# Patient Record
Sex: FEMALE | Race: BLACK OR AFRICAN AMERICAN | Employment: FULL TIME | URBAN - METROPOLITAN AREA
[De-identification: names, ages, dates, MRNs, and addresses within clinical notes are randomized per-mention and may not be internally consistent; named-entity substitution may affect disease eponyms.]

---

## 2017-05-11 ENCOUNTER — TELEPHONE (OUTPATIENT)
Dept: OBGYN CLINIC | Age: 27
End: 2017-05-11

## 2017-05-11 NOTE — TELEPHONE ENCOUNTER
Call received at 11:43 am      32year old patient last seen in the office on 4/15/16. Patient states she was seen in [place] for AE. Patient states she has her records from that visit. Patient reports that she has stopped taking birthcontrol back in November 2016. Patient reports the month of April that she has had two cycles and one starting on April 13-17 and the second April 25-29. Patient reports the bleeding is heavier than when she was on the pill. Patient reports mild cramping and states she changes her pad or tampon every couple of hours. Patient reports starting cycle today May 11, 2017. This nurse advised patient to check UPT. Patient states she is not pregnant because she uses protection. Patient wondering if she needs to be seen.  Please advise

## 2017-05-12 NOTE — TELEPHONE ENCOUNTER
Patient called back and was advised of MD recommendations. Patient chose to be placed on the schedule for 1:30pm on 5/16/17. Patient then asked how long would she need to wait if  and her travelled to [facility]. Patient advised they would need to wait six months in light of the Zika virus try and get pregnant. Patient verbalized understanding.

## 2017-05-12 NOTE — TELEPHONE ENCOUNTER
Rec problem visit if wants to evaluate AUB/irreg bleeding. Or pt can observe if regular cycles resume.

## 2017-05-16 ENCOUNTER — OFFICE VISIT (OUTPATIENT)
Dept: OBGYN CLINIC | Age: 27
End: 2017-05-16

## 2017-05-16 VITALS
DIASTOLIC BLOOD PRESSURE: 60 MMHG | WEIGHT: 115 LBS | HEIGHT: 62 IN | SYSTOLIC BLOOD PRESSURE: 108 MMHG | BODY MASS INDEX: 21.16 KG/M2

## 2017-05-16 DIAGNOSIS — N92.6 IRREGULAR MENSES: Primary | ICD-10-CM

## 2017-05-16 DIAGNOSIS — Z30.09 FAMILY PLANNING: ICD-10-CM

## 2017-05-16 NOTE — PROGRESS NOTES
[address] [facility]  [email or web address]  [phone]    Contreras Mcadams MD, FACOG       OB/GYN Problem visit    Chief Complaint:   Chief Complaint   Patient presents with    Menstrual Problem       History of Present Illness: This is a new problem being evaluated by this provider. The patient is a 32 y.o. [de-identified]  female who reports having abnormal periods for 3 months. Patient reports that she stopped taking her OCP in November, and started to have a period every 2 weeks in April. Patient reports that the first period of the month will be her normal flow, but the second period will be heavy for the first 3 days. Patient reports that she had been on OCP for so long that she could not remember what her normal period was like, but her mother stated that this was the reason why she started her on OCP. She reports the symptoms are is unchanged. Aggravating factors include none. Alleviating factors include none. Menses in April irregular: regular before that. Menses 4/13-17 25-29. She does not have other concerns. LMP: Patient's last menstrual period was 05/11/2017 (exact date). PFSH:  Past Medical History:   Diagnosis Date    Dysmenorrhea     better on OCP    Hx of abnormal Pap smear 2/27/13 2/13 neg pap, +HPV neg 16 and18;2/2012 ASCUS pap,+HPV; 2/2011 neg pap, +HPV neg 16 and 18     Past Surgical History:   Procedure Laterality Date    HX COLPOSCOPY  4/15/13    4/13 neg;3/12 CHRISTIE I endo and ecto;3/11 CHRISTIE I endo and ecto     Family History   Problem Relation Age of Onset    Diabetes Maternal Aunt     Hypertension Maternal Grandmother     No Known Problems Mother     No Known Problems Father      Social History   Substance Use Topics    Smoking status: Never Smoker    Smokeless tobacco: Never Used    Alcohol use No     No Known Allergies  No current outpatient prescriptions on file. No current facility-administered medications for this visit.         Review of Systems:  History obtained from the patient  Constitutional: negative for fevers, chills and weight loss  ENT ROS: negative for - hearing change, oral lesions or visual changes  Respiratory: negative for cough, wheezing or dyspnea on exertion  Cardiovascular: negative for chest pain, irregular heart beats, exertional chest pressure/discomfort  Gastrointestinal: negative for dysphagia, nausea and vomiting  Genito-Urinary ROS:  see HPI  Inteument/breast: negative for rash, breast lump and nipple discharge  Musculoskeletal:negative for stiff joints, neck pain and muscle weakness  Endocrine ROS: negative for - breast changes, galactorrhea or temperature intolerance  Hematological and Lymphatic ROS: negative for - blood clots, bruising or swollen lymph nodes    Physical Exam:  Visit Vitals    /60    Ht 5' 2\" (1.575 m)    Wt 115 lb (52.2 kg)    BMI 21.03 kg/m2       GENERAL: alert, well appearing, and in no distress  HEAD: normocephalic, atraumatic. PULM: clear to auscultation, no wheezes, rales or rhonchi, symmetric air entry   COR: normal rate and regular rhythm, S1 and S2 normal   ABDOMEN: soft, nontender, nondistended, no masses or organomegaly   EGBUS: no lesions, no inflammation, no masses  VULVA: normal appearing vulva with no masses, tenderness or lesions  VAGINA: normal appearing vagina with normal color, no lesions, blood tinged discharge  CERVIX: normal appearing cervix without discharge or lesions, non tender  UTERUS: uterus is normal size, shape, consistency and nontender   ADNEXA: normal adnexa in size, nontender and no masses  NEURO: alert, oriented, normal speech    Assessment:  Encounter Diagnoses   Name Primary?  Irregular menses Yes    Family planning        Plan:  The patient is advised that she should contact the office if she does not note improvement or if symptoms recur  Recommend follow up with PCP for non-gynecologic complaints and chronic medical problems.     She should contact our office with any questions or concerns  She could keep her routine annual exam appointment. We discussed timed intercourse, menstrual charting, and s/sx of ovulation. I recommended a daily prenatal vitamin. We discussed that if conception does not occur within one year then additional evaluation may be indicated. FU and US if AUB persists  Labs  Consider cyclic provera if NI  Disc zika in pregnancy and fertility rec condoms x 6mos after travel to [address] [place] [address],[address] area with partner  Reviewed timed intercourse, prenatal vitamins, menstrual charting. Discussed typical course/time to conception. Orders Placed This Encounter    TSH REFLEX TO T4    PROLACTIN    CBC W/O DIFF    CHLAMYDIA / Orvil Lacer       No results found for this visit on 05/16/17.

## 2017-05-16 NOTE — PATIENT INSTRUCTIONS
Abnormal Uterine Bleeding: Care Instructions  Your Care Instructions  Abnormal uterine bleeding (AUB) is irregular bleeding from the uterus that is longer or heavier than usual or does not occur at your regular time. Sometimes it is caused by changes in hormone levels. It can also be caused by growths in the uterus, such as fibroids or polyps. Sometimes a cause cannot be found. You may have heavy bleeding when you are not expecting your period. Your doctor may suggest a pregnancy test, if you think you are pregnant. Follow-up care is a key part of your treatment and safety. Be sure to make and go to all appointments, and call your doctor if you are having problems. It's also a good idea to know your test results and keep a list of the medicines you take. How can you care for yourself at home? · Be safe with medicines. Take pain medicines exactly as directed. ¨ If the doctor gave you a prescription medicine for pain, take it as prescribed. ¨ If you are not taking a prescription pain medicine, ask your doctor if you can take an over-the-counter medicine. · You may be low in iron because of blood loss. Eat a balanced diet that is high in iron and vitamin C. Foods rich in iron include red meat, shellfish, eggs, beans, and leafy green vegetables. Talk to your doctor about whether you need to take iron pills or a multivitamin. When should you call for help? Call 911 anytime you think you may need emergency care. For example, call if:  · You passed out (lost consciousness). Call your doctor now or seek immediate medical care if:  · You have sudden, severe pain in your belly or pelvis. · You have severe vaginal bleeding. You are soaking through your usual pads or tampons every hour for 2 or more hours. · You feel dizzy or lightheaded, or you feel like you may faint. Watch closely for changes in your health, and be sure to contact your doctor if:  · You have new belly or pelvic pain.   · You have a fever.  · Your bleeding gets worse or lasts longer than 1 week. · You think you may be pregnant. Where can you learn more? Go to http://sha-jerardo.info/. Enter C748 in the search box to learn more about \"Abnormal Uterine Bleeding: Care Instructions. \"  Current as of: October 13, 2016  Content Version: 11.2  © 3299-1896 [employer]. Care instructions adapted under license by [employer] (which disclaims liability or warranty for this information). If you have questions about a medical condition or this instruction, always ask your healthcare professional. [place] [address] any warranty or liability for your use of this information.

## 2017-05-16 NOTE — MR AVS SNAPSHOT
Visit Information Date & Time Provider Department Dept. Phone Encounter #  
 5/16/2017  1:30 PM John Vidales MD [facility] [phone] [id number] Upcoming Health Maintenance Date Due  
 HPV AGE 9Y-34Y (1 of 3 - Female 3 Dose Series) 7/26/2001 PAP AKA CERVICAL CYTOLOGY 2/27/2017 INFLUENZA AGE 9 TO ADULT 8/1/2017 Allergies as of 5/16/2017  Review Complete On: 5/16/2017 By: Christie Phillips LPN No Known Allergies Current Immunizations  Never Reviewed No immunizations on file. Not reviewed this visit Vitals BP Height(growth percentile) Weight(growth percentile) LMP BMI OB Status 108/60 5' 2\" (1.575 m) 115 lb (52.2 kg) 05/11/2017 (Exact Date) 21.03 kg/m2 Unknown Smoking Status Never Smoker BMI and BSA Data Body Mass Index Body Surface Area 21.03 kg/m 2 1.51 m 2 Preferred Pharmacy Pharmacy Name Phone [id number] [address] [phone] Your Updated Medication List  
  
   
This list is accurate as of: 5/16/17  2:13 PM.  Always use your most recent med list.  
  
  
  
  
 * estradiol 1 mg tablet Commonly known as:  ESTRACE Take 1 Tab by mouth daily. * estradiol 1 mg tablet Commonly known as:  ESTRACE Take 1 Tab by mouth daily. norethindrone-ethinyl estradiol-iron 1.5 mg-30 mcg (21)/75 mg (7) Tab Commonly known as:  JUNEL FE 1.5/30 (28) Take 1 Tab by mouth daily. nystatin-triamcinolone topical cream  
Commonly known as:  MYCOLOG II Apply  to affected area two (2) times a day. SINGULAIR PO Take  by mouth. * Notice: This list has 2 medication(s) that are the same as other medications prescribed for you. Read the directions carefully, and ask your doctor or other care provider to review them with you. Patient Instructions Abnormal Uterine Bleeding: Care Instructions Your Care Instructions Abnormal uterine bleeding (AUB) is irregular bleeding from the uterus that is longer or heavier than usual or does not occur at your regular time. Sometimes it is caused by changes in hormone levels. It can also be caused by growths in the uterus, such as fibroids or polyps. Sometimes a cause cannot be found. You may have heavy bleeding when you are not expecting your period. Your doctor may suggest a pregnancy test, if you think you are pregnant. Follow-up care is a key part of your treatment and safety. Be sure to make and go to all appointments, and call your doctor if you are having problems. It's also a good idea to know your test results and keep a list of the medicines you take. How can you care for yourself at home? · Be safe with medicines. Take pain medicines exactly as directed. ¨ If the doctor gave you a prescription medicine for pain, take it as prescribed. ¨ If you are not taking a prescription pain medicine, ask your doctor if you can take an over-the-counter medicine. · You may be low in iron because of blood loss. Eat a balanced diet that is high in iron and vitamin C. Foods rich in iron include red meat, shellfish, eggs, beans, and leafy green vegetables. Talk to your doctor about whether you need to take iron pills or a multivitamin. When should you call for help? Call 911 anytime you think you may need emergency care. For example, call if: 
· You passed out (lost consciousness). Call your doctor now or seek immediate medical care if: 
· You have sudden, severe pain in your belly or pelvis. · You have severe vaginal bleeding. You are soaking through your usual pads or tampons every hour for 2 or more hours. · You feel dizzy or lightheaded, or you feel like you may faint. Watch closely for changes in your health, and be sure to contact your doctor if: 
· You have new belly or pelvic pain. · You have a fever. · Your bleeding gets worse or lasts longer than 1 week. · You think you may be pregnant. Where can you learn more? Go to http://sha-jerardo.info/. Enter M345 in the search box to learn more about \"Abnormal Uterine Bleeding: Care Instructions. \" Current as of: October 13, 2016 Content Version: 11.2 © 6391-5476 [employer]. Care instructions adapted under license by [employer] (which disclaims liability or warranty for this information). If you have questions about a medical condition or this instruction, always ask your healthcare professional. [address] any warranty or liability for your use of this information. Please provide this summary of care documentation to your next provider. Your primary care clinician is listed as Everton Romero. If you have any questions after today's visit, please call [phone].

## 2017-05-17 LAB
ERYTHROCYTE [DISTWIDTH] IN BLOOD BY AUTOMATED COUNT: 13.3 % (ref 12.3–15.4)
HCT VFR BLD AUTO: 43.1 % (ref 34–46.6)
HGB BLD-MCNC: 14.5 G/DL (ref 11.1–15.9)
MCH RBC QN AUTO: 30.1 PG (ref 26.6–33)
MCHC RBC AUTO-ENTMCNC: 33.6 G/DL (ref 31.5–35.7)
MCV RBC AUTO: 90 FL (ref 79–97)
PLATELET # BLD AUTO: 235 X10E3/UL (ref 150–379)
PROLACTIN SERPL-MCNC: 9.7 NG/ML (ref 4.8–23.3)
RBC # BLD AUTO: 4.81 X10E6/UL (ref 3.77–5.28)
TSH SERPL DL<=0.005 MIU/L-ACNC: 3.87 UIU/ML (ref 0.45–4.5)
WBC # BLD AUTO: 7 X10E3/UL (ref 3.4–10.8)

## 2017-05-18 ENCOUNTER — TELEPHONE (OUTPATIENT)
Dept: OBGYN CLINIC | Age: 27
End: 2017-05-18

## 2017-05-18 LAB
C TRACH RRNA SPEC QL NAA+PROBE: NEGATIVE
N GONORRHOEA RRNA SPEC QL NAA+PROBE: NEGATIVE

## 2017-05-18 NOTE — TELEPHONE ENCOUNTER
LMTCB    ----- Message from Martina Bonilla MD sent at 5/17/2017  4:50 PM EDT -----  The results are normal.   Please notify patient. Recommend f/u if still having symptoms/problems or has additional concerns.

## 2017-05-19 NOTE — TELEPHONE ENCOUNTER
Call received at 8:30am      32year old patient last seen in the office on 5/16/17  Patient called back in regards to missed call from the office. Patient advised of MD reviewed labs and recommendations. Patient verbalized understanding.

## 2017-05-22 ENCOUNTER — TELEPHONE (OUTPATIENT)
Dept: OBGYN CLINIC | Age: 27
End: 2017-05-22

## 2017-05-22 NOTE — TELEPHONE ENCOUNTER
Probably very low risk, but if she wants to be extra careful can take Plan B, works better closer to the time of unprotected intercourse and < 72 hours. It is available OTC. Rec fu if AUB persists, keep menstrual calendar.

## 2017-05-22 NOTE — TELEPHONE ENCOUNTER
Call received at 10:52am       32year old patient last seen in the office on 5/16/17. [place] say [place] [facility] [place] and they had intercourse that was to have been protected but the condom broke. Patient reports her last period ended on 5/15/17. Patient states she has a period every 10 days and does not feel like she was ovulating when they had intercourse. Patient is wondering how to proceed.   Please advise

## 2018-07-25 PROBLEM — Z34.90 PREGNANCY: Status: ACTIVE | Noted: 2018-07-25

## 2018-10-02 ENCOUNTER — HOSPITAL ENCOUNTER (EMERGENCY)
Age: 28
Discharge: HOME | End: 2018-10-02
Attending: OBSTETRICS & GYNECOLOGY | Admitting: OBSTETRICS & GYNECOLOGY
Payer: COMMERCIAL

## 2018-10-02 VITALS
HEART RATE: 103 BPM | RESPIRATION RATE: 16 BRPM | TEMPERATURE: 98 F | DIASTOLIC BLOOD PRESSURE: 66 MMHG | SYSTOLIC BLOOD PRESSURE: 119 MMHG
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
## 2018-10-02 PROCEDURE — 99282 EMERGENCY DEPT VISIT SF MDM: CPT

## 2018-10-02 NOTE — IP AVS SNAPSHOT
[address] [facility] [address] [facility] 
[phone] Patient: Glendy Black MRN: [id number] EOE:[id number] About your hospitalization You were admitted on:  N/A You last received care in the:  [facility] 4 [facility] You were discharged on:  October 2, 2018 Why you were hospitalized Your primary diagnosis was:  Not on File Follow-up Information None Discharge Orders None A check savannah indicates which time of day the medication should be taken. My Medications ASK your doctor about these medications Instructions Each Dose to Equal  
 Morning Noon Evening Bedtime  
 doxylamine-pyridoxine (vit B6) 20-20 mg Tbid Commonly known as:  [employer] Your last dose was: Your next dose is: Take 1 Tab by mouth two (2) times a day. 1 Tab  
    
   
   
   
  
 ondansetron hcl 8 mg tablet Commonly known as:  Blanca Bergman Your last dose was: Your next dose is: Take 1 Tab by mouth every eight (8) hours as needed for Nausea. 8 mg Prenatal Vit27&Calcium-Iron-FA 60 mg iron-1 mg Tab Your last dose was: Your next dose is: Take  by mouth.  
     
   
   
   
  
 progesterone 100 mg capsule Commonly known as:  PROMETRIUM Your last dose was: Your next dose is: Insert 1 Cap into vagina two (2) times a day. 100 mg  
    
   
   
   
  
 TYLENOL 325 mg Cap Generic drug:  acetaminophen Your last dose was: Your next dose is: Take  by mouth. VITAMIN B-6 25 mg tablet Generic drug:  pyridoxine (vitamin B6) Your last dose was: Your next dose is: Take 25 mg by mouth daily. 25 mg  
    
   
   
   
  
 ZANTAC 150 mg tablet Generic drug:  raNITIdine Your last dose was: Your next dose is: Take 150 mg by mouth two (2) times a day. 150 mg Discharge Instructions Weeks 14 to 18 of Your Pregnancy: Care Instructions Your Care Instructions During this time, you may start to \"show,\" so that you look pregnant to people around you. You may also notice some changes in your skin, such as itchy spots on your palms or acne on your face. Your baby is now able to pass urine, and your baby's first stool (meconium) is starting to collect in his or her intestines. Hair is also beginning to grow on your baby's head. At your next visit, between weeks 18 and 20, your doctor may do an ultrasound test. The test allows your doctor to check for certain problems. Your doctor can also tell the sex of your baby. This is a good time to think about whether you want to know whether your baby is a boy or a girl. Talk to your doctor about getting a flu shot to help keep you healthy during your pregnancy. As your pregnancy moves along, it is common to worry or feel anxious. Your body is changing a lot. And you are thinking about giving birth, the health of your baby, and becoming a parent. You can learn to cope with any anxiety and stress you feel. Follow-up care is a key part of your treatment and safety. Be sure to make and go to all appointments, and call your doctor if you are having problems. It's also a good idea to know your test results and keep a list of the medicines you take. How can you care for yourself at home? 
 Reduce stress 
  · Ask for help with cooking and housekeeping.  
  · Figure out who or what causes your stress. Avoid these people or situations as much as possible.  
  · Relax every day. Taking 10- to 15-minute breaks can make a big difference. Take a walk, listen to music, or take a warm bath.  
  · Learn relaxation techniques at prenatal or yoga class. Or buy a relaxation tape.  
  · List your fears about having a baby and becoming a parent.  Share the list with someone you trust. Decide which worries are really small, and try to let them go. Exercise 
  · If you did not exercise much before pregnancy, start slowly. Walking is best. [employer], and do a little more every day.  
  · Brisk walking, easy jogging, low-impact aerobics, water aerobics, and yoga are good choices. Some sports, such as scuba diving, horseback riding, downhill skiing, gymnastics, and water skiing, are not a good idea.  
  · Try to do at least 2½ hours a week of moderate exercise, such as a fast walk. One way to do this is to be active 30 minutes a day, at least 5 days a week. It's fine to be active in blocks of 10 minutes or more throughout your day and week.  
  · Wear loose clothing. And wear shoes and a bra that provide good support.  
  · Warm up and cool down to start and finish your exercise.  
  · If you want to use weights, be sure to use light weights. They reduce stress on your joints.  
 Stay at the best weight for you 
  · Experts recommend that you gain about 1 pound a month during the first 3 months of your pregnancy.  
  · Experts recommend that you gain about 1 pound a week during your last 6 months of pregnancy, for a total weight gain of 25 to 35 pounds.  
  · If you are underweight, you will need to gain more weight (about 28 to 40 pounds).  
  · If you are overweight, you may not need to gain as much weight (about 15 to 25 pounds).  
  · If you are gaining weight too fast, use common sense. Exercise every day, and limit sweets, fast foods, and fats. Choose lean meats, fruits, and vegetables.  
  · If you are having twins or more, your doctor may refer you to a dietitian. Where can you learn more? Go to http://sha-jerardo.info/. Enter K853 in the search box to learn more about \"Weeks 14 to 18 of Your Pregnancy: Care Instructions. \" Current as of: November 21, 2017 Content Version: 11.7 © 4613-7633 Healthwise, Incorporated. Care instructions adapted under license by [employer] (which disclaims liability or warranty for this information). If you have questions about a medical condition or this instruction, always ask your healthcare professional. [address] any warranty or liability for your use of this information. Introducing [facility]! Dear Jim: Thank you for requesting a PlaySay account. Our records indicate that you already have an active PlaySay account. You can access your account anytime at [email or web address] Did you know that you can access your hospital and ER discharge instructions at any time in PlaySay? You can also review all of your test results from your hospital stay or ER visit. Additional Information If you have questions, please visit the Frequently Asked Questions section of the PlaySay website at [email or web address] Remember, PlaySay is NOT to be used for urgent needs. For medical emergencies, dial 911. Now available from your iPhone and Android! Introducing Shaquille Ni As a [employer] patient, I wanted to make you aware of our electronic visit tool called Shaquille KernsNGenTec. [address] 24/7 allows you to connect within minutes with a medical provider 24 hours a day, seven days a week via a mobile device or tablet or logging into a secure website from your computer. You can access Shaquille Ni from anywhere in the [place]. A virtual visit might be right for you when you have a simple condition and feel like you just dont want to get out of bed, or cant get away from work for an appointment, when your regular [address] provider is not available (evenings, weekends or holidays), or when youre out of town and need minor care.   Electronic visits cost only $49 and if the [facility] 24/7 provider determines a prescription is needed to treat your condition, one can be electronically transmitted to a nearby pharmacy*. Please take a moment to enroll today if you have not already done so. The enrollment process is free and takes just a few minutes. To enroll, please download the [employer] 24/7 sita to your tablet or phone, or visit [email or web address] to enroll on your computer. And, as an [address] [place] [address] patient with a ScraperWiki account, the results of your visits will be scanned into your electronic medical record and your primary care provider will be able to view the scanned results. We urge you to continue to see your regular [employer] provider for your ongoing medical care. And while your primary care provider may not be the one available when you seek a VYou virtual visit, the peace of mind you get from getting a real diagnosis real time can be priceless. For more information on VYou, view our Frequently Asked Questions (FAQs) at [email or web address]. Sincerely, 
 
Klarissa Camargo MD 
Chief Medical Officer [facility] Marquita Horn *:  certain medications cannot be prescribed via VYou Providers Seen During Your Hospitalization Provider Specialty Primary office phone Иван Diaz MD Obstetrics & Gynecology [phone] Your Primary Care Physician (PCP) Primary Care Physician Office Phone Office Fax Channing Rdz [phone] You are allergic to the following No active allergies Recent Documentation OB Status Smoking Status Pregnant Never Smoker Emergency Contacts Name Discharge Info Relation Home Work Mobile Yousif Lan  Spouse [3] [phone] Patient Belongings The following personal items are in your possession at time of discharge: Please provide this summary of care documentation to your next provider. Signatures-by signing, you are acknowledging that this After Visit Summary has been reviewed with you and you have received a copy. Patient Signature:  ____________________________________________________________ Date:  ____________________________________________________________  
  
Bianka Stake Provider Signature:  ____________________________________________________________ Date:  ____________________________________________________________

## 2018-10-02 NOTE — DISCHARGE INSTRUCTIONS
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
## 2018-10-17 ENCOUNTER — HOSPITAL ENCOUNTER (EMERGENCY)
Age: 28
Discharge: HOME | End: 2018-10-17
Attending: OBSTETRICS & GYNECOLOGY | Admitting: OBSTETRICS & GYNECOLOGY
Payer: COMMERCIAL

## 2018-10-17 VITALS
WEIGHT: 130 LBS | TEMPERATURE: 98.7 F | BODY MASS INDEX: 23.92 KG/M2 | SYSTOLIC BLOOD PRESSURE: 108 MMHG | DIASTOLIC BLOOD PRESSURE: 73 MMHG | HEIGHT: 62 IN | HEART RATE: 76 BPM

## 2018-10-17 LAB
SERVICE CMNT-IMP: NORMAL
WET PREP GENITAL: NORMAL
WET PREP GENITAL: NORMAL

## 2018-10-17 PROCEDURE — 87210 SMEAR WET MOUNT SALINE/INK: CPT

## 2018-10-17 PROCEDURE — 87491 CHLMYD TRACH DNA AMP PROBE: CPT

## 2018-10-17 PROCEDURE — 99283 EMERGENCY DEPT VISIT LOW MDM: CPT

## 2018-10-17 PROCEDURE — 59025 FETAL NON-STRESS TEST: CPT
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
## 2018-10-18 NOTE — H&P
Chief Complaint Patient presents with  Leg Pain  
 
 
29 y.o. female at 20w1d 
weeks gestation who is seen for right groin pain and some spotting tonight. Has not had recent intercourse or exam. Feels some pelvic pressure but no definite cramping. No other new activity or sx. No prior vaginal dc or bleeding. HISTORY: 
OB History  Para Term  AB Living 1 0 0 0 0 0 SAB TAB Ectopic Molar Multiple Live Births  
0 0 0   0 # Outcome Date GA Lbr Geraldo/2nd Weight Sex Delivery Anes PTL Lv  
1 Current Social History Substance and Sexual Activity Sexual Activity Yes  Partners: Male  Birth control/protection: None Patient's last menstrual period was 2018 (exact date). Social History Socioeconomic History  Marital status:  Spouse name: Samina Bruner  Number of children: 0  
 Years of education: masters  Highest education level: Master's degree (e.g., MA, MS, Amy, MEd, MSW, BERNADETTE) Social Needs  Financial resource strain: Not very hard  Food insecurity - worry: Never true  Food insecurity - inability: Never true  Transportation needs - medical: No  
 Transportation needs - non-medical: No  
Occupational History  Not on file Tobacco Use  Smoking status: Never Smoker  Smokeless tobacco: Never Used Substance and Sexual Activity  Alcohol use: Yes Comment: none with +UPT  Drug use: No  
 Sexual activity: Yes  
  Partners: Male Birth control/protection: None Other Topics Concern [address] Service Not Asked  Blood Transfusions Not Asked  Caffeine Concern Not Asked  Occupational Exposure Not Asked Noy Pavy Hazards Not Asked  Sleep Concern Not Asked  Stress Concern Not Asked  Weight Concern Not Asked  Special Diet Not Asked  Back Care Not Asked  Exercise Not Asked  Bike Helmet Not Asked  Seat Belt Not Asked  Self-Exams Not Asked Social History Narrative  Not on file Past Surgical History:  
Procedure Laterality Date  HX COLPOSCOPY  4/15/13  
 4/13 neg;3/12 CHRISTIE I endo and ecto;3/11 CHRISTIE I endo and ecto Past Medical History:  
Diagnosis Date  Abnormal Papanicolaou smear of cervix  Anemia  Dysmenorrhea   
 better on OCP  
 Hx of abnormal Pap smear 2/27/13 2/13 neg pap, +HPV neg 16 and18;2/2012 ASCUS pap,+HPV; 2/2011 neg pap, +HPV neg 16 and 18  Psychiatric problem ROS: 
Negative: 
 negative 10 point ROS except as noted in HPI Positive: 
 per hpi PHYSICAL EXAM: 
Blood pressure 108/73, pulse 76, temperature 98.7 °F (37.1 °C), height 5' 2\" (1.575 m), weight 59 kg (130 lb), last menstrual period 05/29/2018. The patient appears well, alert, oriented x 3. Appropriate affect. Lungs are clear. Heart RRR, no murmurs. Abdomen soft, non-tender, no rebound/guarding. Fundus soft and non tender Skin warm, dry, no rashes Ext no edema, DTR's normal 
SSE: no unusual d/c, normal appearing cervix, no unusual vaginal discharge. Wet mount and gen probe obtained. Fetal Heart Rate: doppler TVUS: cervix >4 cm x 3 measurements. Wet mount-neg Assessment: 
29 y.o. female at 20w1d pelvic pressure, no evidence of cervical shortening. Plan: 
gen probe sent. To fu at office. Patient reassured, d/c home. Kaci Veliz MD

## 2018-10-18 NOTE — PROGRESS NOTES
Resulted labs shared with patient. Pt discharged home with discharge instructions. Will follow up in office as needed.

## 2018-10-18 NOTE — DISCHARGE INSTRUCTIONS
Learning About When to Call Your Doctor During Pregnancy (After 20 Weeks)  Your Care Instructions  It's common to have concerns about what might be a problem during pregnancy. Although most pregnant women don't have any serious problems, it's important to know when to call your doctor if you have certain symptoms or signs of labor. These are general suggestions. Your doctor may give you some more information about when to call. When to call your doctor (after 20 weeks)  Call 911 anytime you think you may need emergency care. For example, call if:  · You have severe vaginal bleeding. · You have sudden, severe pain in your belly. · You passed out (lost consciousness). · You have a seizure. · You see or feel the umbilical cord. · You think you are about to deliver your baby and can't make it safely to the hospital.  Call your doctor now or seek immediate medical care if:  · You have vaginal bleeding. · You have belly pain. · You have a fever. · You have symptoms of preeclampsia, such as:  ? Sudden swelling of your face, hands, or feet. ? New vision problems (such as dimness or blurring). ? A severe headache. · You have a sudden release of fluid from your vagina. (You think your water broke.)  · You think that you may be in labor. This means that you've had at least 4 contractions within 20 minutes or at least 8 contractions in an hour. · You notice that your baby has stopped moving or is moving much less than normal.  · You have symptoms of a urinary tract infection. These may include:  ? Pain or burning when you urinate. ? A frequent need to urinate without being able to pass much urine. ? Pain in the flank, which is just below the rib cage and above the waist on either side of the back. ? Blood in your urine. Watch closely for changes in your health, and be sure to contact your doctor if:  · You have vaginal discharge that smells bad. · You have skin changes, such as:  ?  A rash.  ? Itching. ? Yellow color to your skin. · You have other concerns about your pregnancy. If you have labor signs at 37 weeks or more  If you have signs of labor at 37 weeks or more, your doctor may tell you to call when your labor becomes more active. Symptoms of active labor include:  · Contractions that are regular. · Contractions that are less than 5 minutes apart. · Contractions that are hard to talk through. Follow-up care is a key part of your treatment and safety. Be sure to make and go to all appointments, and call your doctor if you are having problems. It's also a good idea to know your test results and keep a list of the medicines you take. Where can you learn more? Go to http://sha-jerardo.info/. Enter  in the search box to learn more about \"Learning About When to Call Your Doctor During Pregnancy (After 20 Weeks). \"  Current as of: November 21, 2017  Content Version: 11.8  © 0730-9853 Healthwise, Incorporated. Care instructions adapted under license by [employer] (which disclaims liability or warranty for this information). If you have questions about a medical condition or this instruction, always ask your healthcare professional. [place] [address] any warranty or liability for your use of this information.

## 2018-10-20 LAB
C TRACH RRNA SPEC QL NAA+PROBE: NEGATIVE
N GONORRHOEA RRNA SPEC QL NAA+PROBE: NEGATIVE
SPECIMEN SOURCE: NORMAL

## 2018-10-23 ENCOUNTER — HOSPITAL ENCOUNTER (EMERGENCY)
Age: 28
Discharge: HOME | End: 2018-10-23
Attending: OBSTETRICS & GYNECOLOGY | Admitting: OBSTETRICS & GYNECOLOGY
Payer: COMMERCIAL

## 2018-10-23 VITALS
OXYGEN SATURATION: 96 % | RESPIRATION RATE: 16 BRPM | DIASTOLIC BLOOD PRESSURE: 61 MMHG | SYSTOLIC BLOOD PRESSURE: 103 MMHG | TEMPERATURE: 98.3 F | HEART RATE: 88 BPM

## 2018-10-23 DIAGNOSIS — N64.4 BREAST PAIN: Primary | ICD-10-CM

## 2018-10-23 PROCEDURE — 99283 EMERGENCY DEPT VISIT LOW MDM: CPT | Performed by: STUDENT IN AN ORGANIZED HEALTH CARE EDUCATION/TRAINING PROGRAM

## 2018-10-23 RX ORDER — CEPHALEXIN 500 MG/1
500 CAPSULE ORAL 4 TIMES DAILY
Qty: 28 CAP | Refills: 0 | Status: SHIPPED | OUTPATIENT
Start: 2018-10-23 | End: 2018-10-30

## 2018-10-23 NOTE — ED NOTES
I have reviewed discharge instructions with the patient. The patient verbalized understanding. Patient left ED via Discharge Method: ambulatory to Home with spouse. Opportunity for questions and clarification provided. Patient given 1 scripts. To continue your aftercare when you leave the hospital, you may receive an automated call from our care team to check in on how you are doing. This is a free service and part of our promise to provide the best care and service to meet your aftercare needs.  If you have questions, or wish to unsubscribe from this service please call [phone].   Thank you for Choosing our [employer] Emergency Department. '

## 2018-10-23 NOTE — PROGRESS NOTES
Pt ambulatory to triage with complaint of possible spider bite on left breast. Dr Paddy Lockwood notified of pts arrival. Order received to take patients vitals and transfer pt to ED for further evaluation. Pt denies any obstetrical complaints at this time.

## 2018-10-26 NOTE — ED PROVIDER NOTES
30 yo female currently pregnant presents with suspected insect bite to the left breast. Pt. States she felt a sting yesterday then noted \"fang marks\" at the superior aspect of the left breast. Notes mild discomfort and some redness. Denies nipple inversion/drainage or pain elsewhere. No known allergies. She denies any close contacts with similar symptoms. Patient denies associated fever, chills, chest pain or shortness of breath. Past Medical History:  
Diagnosis Date  Abnormal Papanicolaou smear of cervix  Anemia  Dysmenorrhea   
 better on OCP  
 Hx of abnormal Pap smear 2/27/13 2/13 neg pap, +HPV neg 16 and18;2/2012 ASCUS pap,+HPV; 2/2011 neg pap, +HPV neg 16 and 18  Psychiatric problem Past Surgical History:  
Procedure Laterality Date  HX COLPOSCOPY  4/15/13  
 4/13 neg;3/12 [facility]IE I endo and ecto;3/11 CHRISTIE I endo and ecto Family History:  
Problem Relation Age of Onset  Diabetes Paternal Aunt  Hypertension Maternal Grandmother  Diabetes Maternal Grandmother  No Known Problems Mother  No Known Problems Father  Diabetes Maternal Grandfather  No Known Problems Paternal Grandmother  No Known Problems Paternal Grandfather Social History Socioeconomic History  Marital status:  Spouse name: Zaida Hoffman  Number of children: 0  
 Years of education: masters  Highest education level: Master's degree (e.g., MA, MS, Amy, MEd, MSW, BERNADETTE) Social Needs  Financial resource strain: Not very hard  Food insecurity - worry: Never true  Food insecurity - inability: Never true  Transportation needs - medical: No  
 Transportation needs - non-medical: No  
Occupational History  Not on file Tobacco Use  Smoking status: Never Smoker  Smokeless tobacco: Never Used Substance and Sexual Activity  Alcohol use: Yes Comment: none with +UPT  Drug use: No  
 Sexual activity: Yes  
 Partners: Male Birth control/protection: None Other Topics Concern [address] Service Not Asked  Blood Transfusions Not Asked  Caffeine Concern Not Asked  Occupational Exposure Not Asked [place] Hazards Not Asked  Sleep Concern Not Asked  Stress Concern Not Asked  Weight Concern Not Asked  Special Diet Not Asked  Back Care Not Asked  Exercise Not Asked  Bike Helmet Not Asked  Seat Belt Not Asked  Self-Exams Not Asked Social History Narrative  Not on file ALLERGIES: Patient has no known allergies. Review of Systems All other systems reviewed and are negative. Vitals:  
 10/23/18 6814 10/23/18 9413 10/23/18 9271 10/23/18 1409 BP: 100/64  103/61 Pulse:   88 Resp:   16 Temp:      
SpO2: 97% 97% 94% 96% Physical Exam  
Constitutional: She is oriented to person, place, and time. She appears well-developed and well-nourished. No distress. Well-appearing female patient. HENT:  
Head: Normocephalic and atraumatic. Eyes: EOM are normal. Pupils are equal, round, and reactive to light. Neck: Normal range of motion. Neck supple. Cardiovascular: Normal rate, regular rhythm, normal heart sounds and intact distal pulses. Pulmonary/Chest: Effort normal and breath sounds normal. No stridor. No respiratory distress. She has no wheezes. She has no rales. She exhibits no tenderness. Abdominal: Soft. Gravid abdomen Musculoskeletal: Normal range of motion. Neurological: She is alert and oriented to person, place, and time. No cranial nerve deficit. Skin: She is not diaphoretic. Very faint erythema, pinpoint area noted to the left chest wall without evidence of cellulitis, any significant areas of fluctuance. No other findings. Nursing note and vitals reviewed. MDM Number of Diagnoses or Management Options Breast pain: new and requires workup Diagnosis management comments: Provided patient with reassurance and rx for abx if pain/redness/swelling worsened. Advised to return if symptoms worsen. Risk of Complications, Morbidity, and/or Mortality Presenting problems: low Diagnostic procedures: low Management options: low Patient Progress Patient progress: stable Procedures

## 2018-11-02 ENCOUNTER — HOSPITAL ENCOUNTER (OUTPATIENT)
Age: 28
Setting detail: OBSERVATION
Discharge: HOME | End: 2018-11-03
Attending: OBSTETRICS & GYNECOLOGY | Admitting: OBSTETRICS & GYNECOLOGY
Payer: COMMERCIAL

## 2018-11-02 ENCOUNTER — HOSPITAL ENCOUNTER (OUTPATIENT)
Age: 28
Setting detail: OBSERVATION
Discharge: HOME | End: 2018-11-02
Attending: OBSTETRICS & GYNECOLOGY | Admitting: OBSTETRICS & GYNECOLOGY
Payer: COMMERCIAL

## 2018-11-02 VITALS
HEART RATE: 80 BPM | DIASTOLIC BLOOD PRESSURE: 73 MMHG | SYSTOLIC BLOOD PRESSURE: 121 MMHG | TEMPERATURE: 99.1 F | RESPIRATION RATE: 18 BRPM | OXYGEN SATURATION: 98 %

## 2018-11-02 VITALS
TEMPERATURE: 98.5 F | SYSTOLIC BLOOD PRESSURE: 109 MMHG | DIASTOLIC BLOOD PRESSURE: 69 MMHG | HEART RATE: 83 BPM | OXYGEN SATURATION: 97 %

## 2018-11-02 PROBLEM — O21.9 NAUSEA AND VOMITING DURING PREGNANCY: Status: ACTIVE | Noted: 2018-11-02

## 2018-11-02 PROBLEM — J06.9 URI (UPPER RESPIRATORY INFECTION): Status: ACTIVE | Noted: 2018-11-02

## 2018-11-02 LAB
DEPRECATED S PYO AG THROAT QL EIA: NEGATIVE
FLUAV AG NPH QL IA: NEGATIVE
FLUBV AG NPH QL IA: POSITIVE
SPECIMEN SOURCE: ABNORMAL

## 2018-11-02 PROCEDURE — 99285 EMERGENCY DEPT VISIT HI MDM: CPT

## 2018-11-02 PROCEDURE — 87804 INFLUENZA ASSAY W/OPTIC: CPT

## 2018-11-02 PROCEDURE — 74011250636 HC RX REV CODE- 250/636: Performed by: OBSTETRICS & GYNECOLOGY

## 2018-11-02 PROCEDURE — 99218 HC RM OBSERVATION: CPT

## 2018-11-02 PROCEDURE — 96360 HYDRATION IV INFUSION INIT: CPT

## 2018-11-02 PROCEDURE — 87081 CULTURE SCREEN ONLY: CPT

## 2018-11-02 PROCEDURE — 74011250637 HC RX REV CODE- 250/637: Performed by: OBSTETRICS & GYNECOLOGY

## 2018-11-02 PROCEDURE — 74011000250 HC RX REV CODE- 250: Performed by: OBSTETRICS & GYNECOLOGY

## 2018-11-02 PROCEDURE — 76817 TRANSVAGINAL US OBSTETRIC: CPT

## 2018-11-02 PROCEDURE — 87880 STREP A ASSAY W/OPTIC: CPT

## 2018-11-02 PROCEDURE — 96374 THER/PROPH/DIAG INJ IV PUSH: CPT

## 2018-11-02 PROCEDURE — 76815 OB US LIMITED FETUS(S): CPT

## 2018-11-02 RX ORDER — SODIUM CHLORIDE, SODIUM LACTATE, POTASSIUM CHLORIDE, CALCIUM CHLORIDE 600; 310; 30; 20 MG/100ML; MG/100ML; MG/100ML; MG/100ML
999 INJECTION, SOLUTION INTRAVENOUS CONTINUOUS
Status: DISCONTINUED | OUTPATIENT
Start: 2018-11-03 | End: 2018-11-03 | Stop reason: HOSPADM

## 2018-11-02 RX ORDER — OSELTAMIVIR PHOSPHATE 75 MG/1
75 CAPSULE ORAL
Status: COMPLETED | OUTPATIENT
Start: 2018-11-02 | End: 2018-11-02

## 2018-11-02 RX ADMIN — SODIUM CHLORIDE, SODIUM LACTATE, POTASSIUM CHLORIDE, AND CALCIUM CHLORIDE 999 ML/HR: 600; 310; 30; 20 INJECTION, SOLUTION INTRAVENOUS at 23:21

## 2018-11-02 RX ADMIN — SODIUM CHLORIDE 12.5 MG: 9 INJECTION INTRAMUSCULAR; INTRAVENOUS; SUBCUTANEOUS at 23:26

## 2018-11-02 RX ADMIN — OSELTAMIVIR PHOSPHATE 75 MG: 75 CAPSULE ORAL at 19:01

## 2018-11-02 NOTE — ED PROVIDER NOTES
Chief Complaint: 
 
 
29 y.o. female at 22w3d 
weeks gestation who is seen for 1 week h/o HA, runny nose, congestion, and intermittent cough. Pt notes good FM. She denies VB, LOF, uterine ctx, UTI or preeclamptic symptoms. HISTORY: 
 
Social History Substance and Sexual Activity Sexual Activity Yes  Partners: Male  Birth control/protection: None Patient's last menstrual period was 05/29/2018 (exact date). Social History Socioeconomic History  Marital status:  Spouse name: August Rojas  Number of children: 0  
 Years of education: masters  Highest education level: Master's degree (e.g., MA, MS, Amy, MEd, MSW, BERNADETTE) Social Needs  Financial resource strain: Not very hard  Food insecurity - worry: Never true  Food insecurity - inability: Never true  Transportation needs - medical: No  
 Transportation needs - non-medical: No  
Occupational History  Not on file Tobacco Use  Smoking status: Never Smoker  Smokeless tobacco: Never Used Substance and Sexual Activity  Alcohol use: Yes Comment: none with +UPT  Drug use: No  
 Sexual activity: Yes  
  Partners: Male Birth control/protection: None Other Topics Concern [address] Service Not Asked  Blood Transfusions Not Asked  Caffeine Concern Not Asked  Occupational Exposure Not Asked [employer] Hazards Not Asked  Sleep Concern Not Asked  Stress Concern Not Asked  Weight Concern Not Asked  Special Diet Not Asked  Back Care Not Asked  Exercise Not Asked  Bike Helmet Not Asked  Seat Belt Not Asked  Self-Exams Not Asked Social History Narrative  Not on file Past Surgical History:  
Procedure Laterality Date  HX COLPOSCOPY  4/15/13  
 4/13 neg;3/12 [facility]RISTIE I endo and ecto;3/11 [facility] I endo and ecto Past Medical History:  
Diagnosis Date  Abnormal Papanicolaou smear of cervix  Anemia  Dysmenorrhea   
 better on OCP  
  Hx of abnormal Pap smear 2/27/13 2/13 neg pap, +HPV neg 16 and18;2/2012 ASCUS pap,+HPV; 2/2011 neg pap, +HPV neg 16 and 18  Psychiatric problem ROS: 
An 8 point review of symptoms negative except for chief complaint as described above. PHYSICAL EXAM: 
Blood pressure 121/73, pulse 80, temperature 99.1 °F (37.3 °C), resp. rate 18, last menstrual period 05/29/2018, SpO2 98 %. Constitutional: The patient appears well, alert, oriented x 3. Cardiovascular: Heart RRR, no murmurs. Respiratory: Lungs clear, no respiratory distress GI: Abdomen soft, nontender, no guarding No fundal tenderness Musculoskeletal: no cva tenderness Lower ext: no edema, neg ed's, reflexes +2 Skin: no rashes or lesions Psychiatric:Mood/ Affect: appropriate Genitourinary: SVE: long and closed FHT: Category 1 with mod variability TOCO: no ctx Abd ultrasound: active fetus with normal AFV Vaginal ultrasound: CL 4.06cm without funneling Flu swab and Strep throat swab obtained I personally reviewed pt's medical record including relevant labs and ultrasounds Assessment/Plan: Pt with probable viral URI. Symptomatic treatment d/w the pt at length. If flu/strep throat swab are negative, will discharge pt to home with pain and fever precautions. Pt to f/u with her PObP.

## 2018-11-02 NOTE — PROGRESS NOTES
Influenza B positive. Dr Cammy Brown called with results. Order received for Tamiflu 75mg po now and then will D/C with RX for it. To come to discuss POC with pt.

## 2018-11-02 NOTE — PROGRESS NOTES
Medicated with Tamaflu po. Dr Hanna Kaplan at bedside to discuss D/C POC and treatment. Pt tearful but states understanding. Discharge instructions given. Up to dress.

## 2018-11-02 NOTE — PROGRESS NOTES
Pt's flu swab has returned positive for flu B. Pt denies any respiratory symptoms (CP or SOB) and her O2 sat is 97% on RA. Pt also decline nausea, vomiting or chills/fever. Pt given Tamiflu 75mg po and perscription for Tamiflu 75mg po bid for 5 days. Pt given strict respiratory/fever/GI precautions. Pt to f/u with her PObP early next week. All management d/w Dr. Louis Fishman ([facility]) who concurs.

## 2018-11-02 NOTE — PROGRESS NOTES
[facility] ED Admit to [facility]. EFM and TOCO applied. States has HA, sinus drainage and dry cough overnight. Also states \"firm\" abd today followed by cramping. Abd palpated soft. Positive fetal movement noted. Dr Arvil Spoon called.

## 2018-11-02 NOTE — PROGRESS NOTES
STREP throat swab and FLU swab collected and sent to lab. Abd and TV US done at bedside and WNL per Dr Lesvia Jon.   Discuss POC with pt and assessment done per MD.  EFM and TOCO off per MD.

## 2018-11-03 NOTE — PROGRESS NOTES
Pt d.c home, was given script written by Nadia Stockton Md for Zofran. Pt stated feeling much better after IV hydration and medication.

## 2018-11-03 NOTE — H&P
Chief Complaint: nausea and vomiting 
 
 
29 y.o. female at 22w3d 
weeks gestation who was seen earlier this evening. See that note. Pt notes that she went home and ate a cheese burger and then experienced an episode of nausea and vomiting. She denies VB, LOF, CP, SOB, fevers, or chills. HISTORY: 
 
Social History Substance and Sexual Activity Sexual Activity Yes  Partners: Male  Birth control/protection: None Patient's last menstrual period was 05/29/2018 (exact date). Social History Socioeconomic History  Marital status:  Spouse name: August Rojas  Number of children: 0  
 Years of education: masters  Highest education level: Master's degree (e.g., MA, MS, Amy, MEd, MSW, BERNADETTE) Social Needs  Financial resource strain: Not very hard  Food insecurity - worry: Never true  Food insecurity - inability: Never true  Transportation needs - medical: No  
 Transportation needs - non-medical: No  
Occupational History  Not on file Tobacco Use  Smoking status: Never Smoker  Smokeless tobacco: Never Used Substance and Sexual Activity  Alcohol use: Yes Comment: none with +UPT  Drug use: No  
 Sexual activity: Yes  
  Partners: Male Birth control/protection: None Other Topics Concern [address] Service Not Asked  Blood Transfusions Not Asked  Caffeine Concern Not Asked  Occupational Exposure Not Asked [employer] Hazards Not Asked  Sleep Concern Not Asked  Stress Concern Not Asked  Weight Concern Not Asked  Special Diet Not Asked  Back Care Not Asked  Exercise Not Asked  Bike Helmet Not Asked  Seat Belt Not Asked  Self-Exams Not Asked Social History Narrative  Not on file Past Surgical History:  
Procedure Laterality Date  HX COLPOSCOPY  4/15/13  
 4/13 neg;3/12 CHRISTIE I endo and ecto;3/11 CHRISTIE I endo and ecto Past Medical History:  
Diagnosis Date  Abnormal Papanicolaou smear of cervix  Anemia  Dysmenorrhea   
 better on OCP  
 Hx of abnormal Pap smear 2/27/13 2/13 neg pap, +HPV neg 16 and18;2/2012 ASCUS pap,+HPV; 2/2011 neg pap, +HPV neg 16 and 18  Psychiatric problem ROS: 
An 8 point review of symptoms negative except for chief complaint as described above. PHYSICAL EXAM: 
Blood pressure 109/69, pulse 83, temperature 98.5 °F (36.9 °C), last menstrual period 05/29/2018. Constitutional: The patient appears well, alert, oriented x 3. Cardiovascular: Heart RRR, no murmurs. Respiratory: Lungs clear, no respiratory distress GI: Abdomen soft, nontender, no guarding No fundal tenderness Musculoskeletal: no cva tenderness Upper ext: no edema, reflexes +2 Lower ext: no edema, neg ed's, reflexes +2 Skin: no rashes or lesions Psychiatric:Mood/ Affect: appropriate Genitourinary: SVE: not checked FHT:140s with mod variability TOCO: no ctx I personally reviewed pt's medical record including relevant labs and ultrasounds Assessment/Plan: Pt with an episode of N&V after receiving a dose of Tamiflu and eating fast food earlier this evening. Will administer 1 liter of IV fluids with IV phenergan. Pt given a prescription for zofran 4m ODT every 12 hours as needed. Will discharge pt home after above. She will f/u with her PObP.

## 2018-11-03 NOTE — DISCHARGE INSTRUCTIONS
Influenza (Flu): Care Instructions  Your Care Instructions    Influenza (flu) is an infection in the lungs and breathing passages. It is caused by the influenza virus. There are different strains, or types, of the flu virus from year to year. Unlike the common cold, the flu comes on suddenly and the symptoms, such as a cough, congestion, fever, chills, fatigue, aches, and pains, are more severe. These symptoms may last up to 10 days. Although the flu can make you feel very sick, it usually doesn't cause serious health problems. Home treatment is usually all you need for flu symptoms. But your doctor may prescribe antiviral medicine to prevent other health problems, such as pneumonia, from developing. Older people and those who have a long-term health condition, such as lung disease, are most at risk for having pneumonia or other health problems. Follow-up care is a key part of your treatment and safety. Be sure to make and go to all appointments, and call your doctor if you are having problems. It's also a good idea to know your test results and keep a list of the medicines you take. How can you care for yourself at home? · Get plenty of rest.  · Drink plenty of fluids, enough so that your urine is light yellow or clear like water. If you have kidney, heart, or liver disease and have to limit fluids, talk with your doctor before you increase the amount of fluids you drink. · Take an over-the-counter pain medicine if needed, such as acetaminophen (Tylenol), ibuprofen (Advil, Motrin), or naproxen (Aleve), to relieve fever, headache, and muscle aches. Read and follow all instructions on the label. No one younger than 20 should take aspirin. It has been linked to Reye syndrome, a serious illness. · Do not smoke. Smoking can make the flu worse. If you need help quitting, talk to your doctor about stop-smoking programs and medicines. These can increase your chances of quitting for good.   · Breathe moist air from a hot shower or from a sink filled with hot water to help clear a stuffy nose. · Before you use cough and cold medicines, check the label. These medicines may not be safe for young children or for people with certain health problems. · If the skin around your nose and lips becomes sore, put some petroleum jelly on the area. · To ease coughing:  ? Drink fluids to soothe a scratchy throat. ? Suck on cough drops or plain hard candy. ? Raise your head at night with an extra pillow. This may help you rest if coughing keeps you awake. · Take any prescribed medicine exactly as directed. Call your doctor if you think you are having a problem with your medicine. To avoid spreading the flu  · Wash your hands regularly, and keep your hands away from your face. · Stay home from school, work, and other public places until you are feeling better and your fever has been gone for at least 24 hours. The fever needs to have gone away on its own without the help of medicine. · Ask people living with you to talk to their doctors about preventing the flu. They may get antiviral medicine to keep from getting the flu from you. · To prevent the flu in the future, get a flu vaccine every fall. Encourage people living with you to get the vaccine. · Cover your mouth when you cough or sneeze. When should you call for help? Call 911 anytime you think you may need emergency care. For example, call if:    · You have severe trouble breathing.    Call your doctor now or seek immediate medical care if:    · You have new or worse trouble breathing.     · You seem to be getting much sicker.     · You feel very sleepy or confused.     · You have a new or higher fever.     · You get a new rash.    Watch closely for changes in your health, and be sure to contact your doctor if:    · You begin to get better and then get worse.     · You are not getting better after 1 week. Where can you learn more?   Go to http://sha-jerardo.info/. Enter M446 in the search box to learn more about \"Influenza (Flu): Care Instructions. \"  Current as of: 2017  Content Version: 11.8  © 3424-4674 [employer]. Care instructions adapted under license by [employer] (which disclaims liability or warranty for this information). If you have questions about a medical condition or this instruction, always ask your healthcare professional. [address] any warranty or liability for your use of this information. Pregnancy Precautions: Care Instructions  Your Care Instructions    There is no sure way to prevent labor before your due date ( labor) or to prevent most other pregnancy problems. But there are things you can do to increase your chances of a healthy pregnancy. Go to your appointments, follow your doctor's advice, and take good care of yourself. Eat well, and exercise (if your doctor agrees). And make sure to drink plenty of water. Follow-up care is a key part of your treatment and safety. Be sure to make and go to all appointments, and call your doctor if you are having problems. It's also a good idea to know your test results and keep a list of the medicines you take. How can you care for yourself at home? · Make sure you go to your prenatal appointments. At each visit, your doctor will check your blood pressure. Your doctor will also check to see if you have protein in your urine. High blood pressure and protein in urine are signs of preeclampsia. This condition can be dangerous for you and your baby. · Drink plenty of fluids, enough so that your urine is light yellow or clear like water. Dehydration can cause contractions. If you have kidney, heart, or liver disease and have to limit fluids, talk with your doctor before you increase the amount of fluids you drink.   · Tell your doctor right away if you notice any symptoms of an infection, such as:  ? Burning when you urinate. ? A foul-smelling discharge from your vagina. ? Vaginal itching. ? Unexplained fever. ? Unusual pain or soreness in your uterus or lower belly. · Eat a balanced diet. Include plenty of foods that are high in calcium and iron. ? Foods high in calcium include milk, cheese, yogurt, almonds, and broccoli. ? Foods high in iron include red meat, shellfish, poultry, eggs, beans, raisins, whole-grain bread, and leafy green vegetables. · Do not smoke. If you need help quitting, talk to your doctor about stop-smoking programs and medicines. These can increase your chances of quitting for good. · Do not drink alcohol or use illegal drugs. · Follow your doctor's directions about activity. Your doctor will let you know how much, if any, exercise you can do. · Ask your doctor if you can have sex. If you are at risk for early labor, your doctor may ask you to not have sex. · Take care to prevent falls. During pregnancy, your joints are loose, and your balance is off. Sports such as bicycling, skiing, or in-line skating can increase your risk of falling. And don't ride horses or motorcycles, dive, water ski, scuba dive, or parachute jump while you are pregnant. · Avoid getting very hot. Do not use saunas or hot tubs. Avoid staying out in the sun in hot weather for long periods. Take acetaminophen (Tylenol) to lower a high fever. · Do not take any over-the-counter or herbal medicines or supplements without talking to your doctor or pharmacist first.  When should you call for help? Call 911 anytime you think you may need emergency care. For example, call if:    · You passed out (lost consciousness).     · You have severe vaginal bleeding.     · You have severe pain in your belly or pelvis.     · You have had fluid gushing or leaking from your vagina and you know or think the umbilical cord is bulging into your vagina.  If this happens, immediately get down on your knees so your rear end (buttocks) is higher than your head. This will decrease the pressure on the cord until help arrives.   [facility] your doctor now or seek immediate medical care if:    · You have signs of preeclampsia, such as:  ? Sudden swelling of your face, hands, or feet. ? New vision problems (such as dimness or blurring). ? A severe headache.     · You have any vaginal bleeding.     · You have belly pain or cramping.     · You have a fever.     · You have had regular contractions (with or without pain) for an hour. This means that you have 8 or more within 1 hour or 4 or more in 20 minutes after you change your position and drink fluids.     · You have a sudden release of fluid from your vagina.     · You have low back pain or pelvic pressure that does not go away.     · You notice that your baby has stopped moving or is moving much less than normal.    Watch closely for changes in your health, and be sure to contact your doctor if you have any problems. Where can you learn more? Go to http://sha-jerardo.info/. Enter [id number] in the search box to learn more about \"Pregnancy Precautions: Care Instructions. \"  Current as of: November 21, 2017  Content Version: 11.8  © 3293-4399 [employer]. Care instructions adapted under license by [employer] (which disclaims liability or warranty for this information). If you have questions about a medical condition or this instruction, always ask your healthcare professional. [place] [address] any warranty or liability for your use of this information.

## 2018-11-03 NOTE — PROGRESS NOTES
Pt returned to triage c.o nausea and vomiting for the last several hrs. Was here a few hrs ago and received Tamaflu. States she went home and ate a hamburger. Reports throwing up brown and green with a small amount blood.

## 2018-11-05 LAB
BACTERIA SPEC CULT: NORMAL
SERVICE CMNT-IMP: NORMAL

## 2018-12-12 ENCOUNTER — HOSPITAL ENCOUNTER (OUTPATIENT)
Age: 28
Setting detail: OBSERVATION
Discharge: HOME | End: 2018-12-12
Attending: OBSTETRICS & GYNECOLOGY | Admitting: OBSTETRICS & GYNECOLOGY
Payer: COMMERCIAL

## 2018-12-12 VITALS
DIASTOLIC BLOOD PRESSURE: 59 MMHG | HEIGHT: 62 IN | RESPIRATION RATE: 18 BRPM | WEIGHT: 139 LBS | HEART RATE: 89 BPM | BODY MASS INDEX: 25.58 KG/M2 | SYSTOLIC BLOOD PRESSURE: 96 MMHG | TEMPERATURE: 98 F

## 2018-12-12 PROBLEM — R11.2 NAUSEA AND VOMITING: Status: ACTIVE | Noted: 2018-12-12

## 2018-12-12 PROBLEM — O47.03 PRETERM UTERINE CONTRACTIONS IN THIRD TRIMESTER, ANTEPARTUM: Status: ACTIVE | Noted: 2018-12-12

## 2018-12-12 PROBLEM — O26.899 ANTEPARTUM DEHYDRATION: Status: ACTIVE | Noted: 2018-12-12

## 2018-12-12 PROBLEM — E86.0 ANTEPARTUM DEHYDRATION: Status: ACTIVE | Noted: 2018-12-12

## 2018-12-12 LAB
ALBUMIN SERPL-MCNC: 2.8 G/DL (ref 3.5–5)
ALBUMIN/GLOB SERPL: 0.7 {RATIO}
ALP SERPL-CCNC: 126 U/L (ref 50–136)
ALT SERPL-CCNC: 34 U/L (ref 12–65)
ANION GAP SERPL CALC-SCNC: 11 MMOL/L
AST SERPL-CCNC: 30 U/L (ref 15–37)
BASOPHILS # BLD: 0 K/UL (ref 0–0.2)
BASOPHILS NFR BLD: 0 % (ref 0–2)
BILIRUB SERPL-MCNC: 0.5 MG/DL (ref 0.2–1.1)
BUN SERPL-MCNC: 9 MG/DL (ref 6–23)
CALCIUM SERPL-MCNC: 8 MG/DL (ref 8.3–10.4)
CHLORIDE SERPL-SCNC: 106 MMOL/L (ref 98–107)
CO2 SERPL-SCNC: 23 MMOL/L (ref 21–32)
CREAT SERPL-MCNC: 0.75 MG/DL (ref 0.6–1)
DIFFERENTIAL METHOD BLD: ABNORMAL
EOSINOPHIL # BLD: 0 K/UL (ref 0–0.8)
EOSINOPHIL NFR BLD: 0 % (ref 0.5–7.8)
ERYTHROCYTE [DISTWIDTH] IN BLOOD BY AUTOMATED COUNT: 11.9 % (ref 11.9–14.6)
GLOBULIN SER CALC-MCNC: 3.8 G/DL (ref 2.3–3.5)
GLUCOSE SERPL-MCNC: 94 MG/DL (ref 65–100)
HCT VFR BLD AUTO: 40.4 % (ref 35.8–46.3)
HGB BLD-MCNC: 14 G/DL (ref 11.7–15.4)
IMM GRANULOCYTES # BLD: 0.1 K/UL (ref 0–0.5)
IMM GRANULOCYTES NFR BLD AUTO: 1 % (ref 0–5)
LYMPHOCYTES # BLD: 0.6 K/UL (ref 0.5–4.6)
LYMPHOCYTES NFR BLD: 4 % (ref 13–44)
MCH RBC QN AUTO: 31.1 PG (ref 26.1–32.9)
MCHC RBC AUTO-ENTMCNC: 34.7 G/DL (ref 31.4–35)
MCV RBC AUTO: 89.8 FL (ref 79.6–97.8)
MONOCYTES # BLD: 0.7 K/UL (ref 0.1–1.3)
MONOCYTES NFR BLD: 5 % (ref 4–12)
NEUTS SEG # BLD: 12.9 K/UL (ref 1.7–8.2)
NEUTS SEG NFR BLD: 90 % (ref 43–78)
NRBC # BLD: 0 K/UL (ref 0–0.2)
PLATELET # BLD AUTO: 185 K/UL (ref 150–450)
PMV BLD AUTO: 11.5 FL (ref 9.4–12.3)
POTASSIUM SERPL-SCNC: 3.6 MMOL/L (ref 3.5–5.1)
PROT SERPL-MCNC: 6.6 G/DL
RBC # BLD AUTO: 4.5 M/UL (ref 4.05–5.2)
SODIUM SERPL-SCNC: 140 MMOL/L (ref 136–145)
WBC # BLD AUTO: 14.3 K/UL (ref 4.3–11.1)

## 2018-12-12 PROCEDURE — 96361 HYDRATE IV INFUSION ADD-ON: CPT

## 2018-12-12 PROCEDURE — 74011000250 HC RX REV CODE- 250: Performed by: OBSTETRICS & GYNECOLOGY

## 2018-12-12 PROCEDURE — 96376 TX/PRO/DX INJ SAME DRUG ADON: CPT

## 2018-12-12 PROCEDURE — 99284 EMERGENCY DEPT VISIT MOD MDM: CPT

## 2018-12-12 PROCEDURE — 74011250636 HC RX REV CODE- 250/636: Performed by: OBSTETRICS & GYNECOLOGY

## 2018-12-12 PROCEDURE — 85025 COMPLETE CBC W/AUTO DIFF WBC: CPT

## 2018-12-12 PROCEDURE — 59025 FETAL NON-STRESS TEST: CPT

## 2018-12-12 PROCEDURE — 96374 THER/PROPH/DIAG INJ IV PUSH: CPT

## 2018-12-12 PROCEDURE — 99218 HC RM OBSERVATION: CPT

## 2018-12-12 PROCEDURE — 80053 COMPREHEN METABOLIC PANEL: CPT

## 2018-12-12 PROCEDURE — 96375 TX/PRO/DX INJ NEW DRUG ADDON: CPT

## 2018-12-12 RX ORDER — MAG HYDROX/ALUMINUM HYD/SIMETH 200-200-20
30 SUSPENSION, ORAL (FINAL DOSE FORM) ORAL
Status: DISCONTINUED | OUTPATIENT
Start: 2018-12-12 | End: 2018-12-12 | Stop reason: HOSPADM

## 2018-12-12 RX ORDER — SODIUM CHLORIDE 0.9 % (FLUSH) 0.9 %
5-10 SYRINGE (ML) INJECTION EVERY 8 HOURS
Status: DISCONTINUED | OUTPATIENT
Start: 2018-12-12 | End: 2018-12-12 | Stop reason: HOSPADM

## 2018-12-12 RX ORDER — ONDANSETRON 8 MG/1
8 TABLET, ORALLY DISINTEGRATING ORAL
Qty: 10 TAB | Refills: 1 | OUTPATIENT
Start: 2018-12-12 | End: 2018-12-28

## 2018-12-12 RX ORDER — LIDOCAINE HYDROCHLORIDE 20 MG/ML
15 SOLUTION OROPHARYNGEAL
Status: DISCONTINUED | OUTPATIENT
Start: 2018-12-12 | End: 2018-12-12 | Stop reason: HOSPADM

## 2018-12-12 RX ORDER — SODIUM CHLORIDE 0.9 % (FLUSH) 0.9 %
5-10 SYRINGE (ML) INJECTION AS NEEDED
Status: DISCONTINUED | OUTPATIENT
Start: 2018-12-12 | End: 2018-12-12 | Stop reason: HOSPADM

## 2018-12-12 RX ORDER — PROMETHAZINE HYDROCHLORIDE 12.5 MG/1
12.5-25 TABLET ORAL
Qty: 10 TAB | Refills: 1 | OUTPATIENT
Start: 2018-12-12 | End: 2018-12-19

## 2018-12-12 RX ORDER — DEXTROSE MONOHYDRATE, SODIUM CHLORIDE, SODIUM LACTATE, POTASSIUM CHLORIDE, CALCIUM CHLORIDE 5; 600; 310; 179; 20 G/100ML; MG/100ML; MG/100ML; MG/100ML; MG/100ML
INJECTION, SOLUTION INTRAVENOUS CONTINUOUS
Status: DISCONTINUED | OUTPATIENT
Start: 2018-12-12 | End: 2018-12-12 | Stop reason: HOSPADM

## 2018-12-12 RX ORDER — TERBUTALINE SULFATE 1 MG/ML
0.25 INJECTION SUBCUTANEOUS
Status: DISCONTINUED | OUTPATIENT
Start: 2018-12-12 | End: 2018-12-12

## 2018-12-12 RX ORDER — SODIUM CHLORIDE, SODIUM LACTATE, POTASSIUM CHLORIDE, CALCIUM CHLORIDE 600; 310; 30; 20 MG/100ML; MG/100ML; MG/100ML; MG/100ML
500 INJECTION, SOLUTION INTRAVENOUS CONTINUOUS
Status: DISCONTINUED | OUTPATIENT
Start: 2018-12-12 | End: 2018-12-12 | Stop reason: HOSPADM

## 2018-12-12 RX ORDER — ACETAMINOPHEN 325 MG/1
650 TABLET ORAL
Status: DISCONTINUED | OUTPATIENT
Start: 2018-12-12 | End: 2018-12-12 | Stop reason: HOSPADM

## 2018-12-12 RX ADMIN — PROMETHAZINE HYDROCHLORIDE 12.5 MG: 25 INJECTION INTRAMUSCULAR; INTRAVENOUS at 07:48

## 2018-12-12 RX ADMIN — PROMETHAZINE HYDROCHLORIDE 12.5 MG: 25 INJECTION INTRAMUSCULAR; INTRAVENOUS at 04:22

## 2018-12-12 RX ADMIN — LIDOCAINE HYDROCHLORIDE 15 ML: 20 SOLUTION ORAL; TOPICAL at 07:46

## 2018-12-12 RX ADMIN — FAMOTIDINE 20 MG: 10 INJECTION, SOLUTION INTRAVENOUS at 09:17

## 2018-12-12 RX ADMIN — FAMOTIDINE 20 MG: 10 INJECTION, SOLUTION INTRAVENOUS at 04:22

## 2018-12-12 RX ADMIN — POTASSIUM CHLORIDE, SODIUM CHLORIDE, CALCIUM CHLORIDE, SODIUM LACTATE, AND DEXTROSE MONOHYDRATE: 1.79; 6; .2; 3.1; 5 INJECTION, SOLUTION INTRAVENOUS at 04:49

## 2018-12-12 RX ADMIN — SODIUM CHLORIDE, SODIUM LACTATE, POTASSIUM CHLORIDE, AND CALCIUM CHLORIDE 500 ML/HR: 600; 310; 30; 20 INJECTION, SOLUTION INTRAVENOUS at 03:56

## 2018-12-12 NOTE — H&P
History & Physical    Name: Rosi Castillo MRN: [id number]  SSN: [id number]    YOB: 1990  Age: 29 y.o. Sex: female      Subjective:     Reason for Admission:  Pregnancy and  contractions. Pt with nausea and vomiting over past several hours. Now with contractions    History of Present Illness: Ms. Sha Ya is a 29 y.o.  G1 with an estimated gestational age of 29w1d with Estimated Date of Delivery: 3/5/19. Patient complains of severe nausea/vomiting for 3 hours. Pregnancy has been complicated by no complications. Patient denies contractions, fever, pelvic pressure, shortness of breath and vaginal bleeding . Pt c/o vomiting blood and now having \"heartburn\" type pain in her throat.     OB History    Para Term  AB Living   1 0 0 0 0 0   SAB TAB Ectopic Molar Multiple Live Births   0 0 0   0        # Outcome Date GA Lbr Geraldo/2nd Weight Sex Delivery Anes PTL Lv   1 Current                 Past Medical History:   Diagnosis Date    Abnormal Papanicolaou smear of cervix     Anemia     Dysmenorrhea     better on OCP    Hx of abnormal Pap smear 13 neg pap, +HPV neg 16 and18;2012 ASCUS pap,+HPV; 2011 neg pap, +HPV neg 16 and 18    Psychiatric problem      Past Surgical History:   Procedure Laterality Date    HX COLPOSCOPY  4/15/13    4/13 neg;3/12 [facility] I endo and ecto;3/11 [facility] I endo and ecto     Social History     Occupational History    Not on file   Tobacco Use    Smoking status: Never Smoker    Smokeless tobacco: Never Used   Substance and Sexual Activity    Alcohol use: Yes     Comment: none with +UPT    Drug use: No    Sexual activity: Yes     Partners: Male     Birth control/protection: None      Family History   Problem Relation Age of Onset    Diabetes Paternal Aunt     Hypertension Maternal Grandmother     Diabetes Maternal Grandmother     No Known Problems Mother     No Known Problems Father     Diabetes Maternal Grandfather     No Known Problems Paternal Grandmother     No Known Problems Paternal Grandfather        No Known Allergies  Prior to Admission medications    Medication Sig Start Date End Date Taking? Authorizing Provider   Prenatal Vit27&Calcium-Iron-FA 60 mg iron-1 mg tab Take  by mouth. Yes Provider, Historical   acetaminophen (TYLENOL) 325 mg cap Take  by mouth. Yes Provider, Historical        Review of Systems:  A comprehensive review of systems was negative except for that written in the History of Present Illness. Objective:     Vitals:    Vitals:    18 0332 18 0337   BP: 114/79    Pulse: 90    Resp: 17    Temp: 98.5 °F (36.9 °C)    Weight:  63 kg (139 lb)   Height:  5' 2\" (1.575 m)      Temp (24hrs), Av.5 °F (36.9 °C), Min:98.5 °F (36.9 °C), Max:98.5 °F (36.9 °C)    BP  Min: 114/79  Max: 114/79     Physical Exam:  Patient without distress.   Heart: Regular rate and rhythm  Lung: clear to auscultation throughout lung fields, no wheezes, no rales, no rhonchi and normal respiratory effort  Back: costovertebral angle tenderness absent  Abdomen: soft, nontender  Fundus: soft and non tender  Perineum: blood absent, amniotic fluid absent  Cervical Exam: 0 cm dilated    0% effaced    -2 station    Lower Extremities:  - Edema No   - Patellar Reflexes: 2+ bilaterally     Membranes:  Intact  Uterine Activity:  Frequency: Every 2 minutes   Fetal Heart Rate:  Reactive  Baseline: 140 per minute  Variability: moderate  Accelerations: yes  Decelerations: variable  Uterine contractions: regular, every 2 minutes       Lab/Data Review:  Recent Results (from the past 24 hour(s))   CBC WITH AUTOMATED DIFF    Collection Time: 18  3:56 AM   Result Value Ref Range    WBC 14.3 (H) 4.3 - 11.1 K/uL    RBC 4.50 4.05 - 5.2 M/uL    HGB 14.0 11.7 - 15.4 g/dL    HCT 40.4 35.8 - 46.3 %    MCV 89.8 79.6 - 97.8 FL    MCH 31.1 26.1 - 32.9 PG    MCHC 34.7 31.4 - 35.0 g/dL    RDW 11.9 11.9 - 14.6 %    PLATELET 494 909 - 278 K/uL    MPV 11.5 9.4 - 12.3 FL    ABSOLUTE NRBC 0.00 0.0 - 0.2 K/uL    DF AUTOMATED      NEUTROPHILS 90 (H) 43 - 78 %    LYMPHOCYTES 4 (L) 13 - 44 %    MONOCYTES 5 4.0 - 12.0 %    EOSINOPHILS 0 (L) 0.5 - 7.8 %    BASOPHILS 0 0.0 - 2.0 %    IMMATURE GRANULOCYTES 1 0.0 - 5.0 %    ABS. NEUTROPHILS 12.9 (H) 1.7 - 8.2 K/UL    ABS. LYMPHOCYTES 0.6 0.5 - 4.6 K/UL    ABS. MONOCYTES 0.7 0.1 - 1.3 K/UL    ABS. EOSINOPHILS 0.0 0.0 - 0.8 K/UL    ABS. BASOPHILS 0.0 0.0 - 0.2 K/UL    ABS. IMM. GRANS. 0.1 0.0 - 0.5 K/UL       Assessment and Plan: Active Problems:    Nausea and vomiting (2018)       uterine contractions in third trimester, antepartum (2018)      Antepartum dehydration (2018)       30 yo G1 at 28w1d with acute onset nausea and vomiting. Was noted to have contractions every 2 minutes, but pt not really feeling them.  2 decels noted while in triage- possible artifact with mother sitting up to vomit  Will admit, hydrate  Phenergan and pepcid iv  Will monitor to make sure contractions stop   Will follow carefully for hematasis    Signed By:  Cass Edward MD     2018

## 2018-12-12 NOTE — DISCHARGE INSTRUCTIONS
Nausea and Vomiting: Care Instructions  Your Care Instructions    When you are nauseated, you may feel weak and sweaty and notice a lot of saliva in your mouth. Nausea often leads to vomiting. Most of the time you do not need to worry about nausea and vomiting, but they can be signs of other illnesses. Two common causes of nausea and vomiting are stomach flu and food poisoning. Nausea and vomiting from viral stomach flu will usually start to improve within 24 hours. Nausea and vomiting from food poisoning may last from 12 to 48 hours. The doctor has checked you carefully, but problems can develop later. If you notice any problems or new symptoms, get medical treatment right away. Follow-up care is a key part of your treatment and safety. Be sure to make and go to all appointments, and call your doctor if you are having problems. It's also a good idea to know your test results and keep a list of the medicines you take. How can you care for yourself at home? · To prevent dehydration, drink plenty of fluids, enough so that your urine is light yellow or clear like water. Choose water and other caffeine-free clear liquids until you feel better. If you have kidney, heart, or liver disease and have to limit fluids, talk with your doctor before you increase the amount of fluids you drink. · Rest in bed until you feel better. · When you are able to eat, try clear soups, mild foods, and liquids until all symptoms are gone for 12 to 48 hours. Other good choices include dry toast, crackers, cooked cereal, and gelatin dessert, such as Jell-O. When should you call for help? Call 911 anytime you think you may need emergency care. For example, call if:    · You passed out (lost consciousness).    Call your doctor now or seek immediate medical care if:    · You have symptoms of dehydration, such as:  ? Dry eyes and a dry mouth. ? Passing only a little dark urine. ?  Feeling thirstier than usual.     · You have new or worsening belly pain.     · You have a new or higher fever.     · You vomit blood or what looks like coffee grounds.    Watch closely for changes in your health, and be sure to contact your doctor if:    · You have ongoing nausea and vomiting.     · Your vomiting is getting worse.     · Your vomiting lasts longer than 2 days.     · You are not getting better as expected. Where can you learn more? Go to http://sha-jerardo.info/. Enter 25 [id number] in the search box to learn more about \"Nausea and Vomiting: Care Instructions. \"  Current as of: November 20, 2017  Content Version: 11.8  © 5348-1182 "[employer] Care instructions adapted under license by [employer] (which disclaims liability or warranty for this information). If you have questions about a medical condition or this instruction, always ask your healthcare professional. [address] any warranty or liability for your use of this information. Dehydration: Care Instructions  Your Care Instructions  Dehydration happens when your body loses too much fluid. This might happen when you do not drink enough water or you lose large amounts of fluids from your body because of diarrhea, vomiting, or sweating. Severe dehydration can be life-threatening. Water and minerals called electrolytes help put your body fluids back in balance. Learn the early signs of fluid loss, and drink more fluids to prevent dehydration. Follow-up care is a key part of your treatment and safety. Be sure to make and go to all appointments, and call your doctor if you are having problems. It's also a good idea to know your test results and keep a list of the medicines you take. How can you care for yourself at home? · To prevent dehydration, drink plenty of fluids, enough so that your urine is light yellow or clear like water. Choose water and other caffeine-free clear liquids until you feel better.  If you have kidney, heart, or liver disease and have to limit fluids, talk with your doctor before you increase the amount of fluids you drink. · If you do not feel like eating or drinking, try taking small sips of water, sports drinks, or other rehydration drinks. · Get plenty of rest.  To prevent dehydration  · Add more fluids to your diet and daily routine, unless your doctor has told you not to. · During hot weather, drink more fluids. Drink even more fluids if you exercise a lot. Stay away from drinks with alcohol or caffeine. · Watch for the symptoms of dehydration. These include:  ? A dry, sticky mouth. ? Dark yellow urine, and not much of it. ? Dry and sunken eyes. ? Feeling very tired. · Learn what problems can lead to dehydration. These include:  ? Diarrhea, fever, and vomiting. ? Any illness with a fever, such as pneumonia or the flu. ? Activities that cause heavy sweating, such as endurance races and heavy outdoor work in hot or humid weather. ? Alcohol or drug abuse or withdrawal.  ? Certain medicines, such as cold and allergy pills (antihistamines), diet pills (diuretics), and laxatives. ? Certain diseases, such as diabetes, cancer, and heart or kidney disease. When should you call for help? Call 911 anytime you think you may need emergency care. For example, call if:    · You passed out (lost consciousness).    Call your doctor now or seek immediate medical care if:    · You are confused and cannot think clearly.     · You are dizzy or lightheaded, or you feel like you may faint.     · You have signs of needing more fluids. You have sunken eyes and a dry mouth, and you pass only a little dark urine.     · You cannot keep fluids down.    Watch closely for changes in your health, and be sure to contact your doctor if:    · You are not making tears.     · Your skin is very dry and sags slowly back into place after you pinch it.     · Your mouth and eyes are very dry. Where can you learn more?   Go to http://sha-jerardo.info/. Enter A482 in the search box to learn more about \"Dehydration: Care Instructions. \"  Current as of: November 20, 2017  Content Version: 11.8  © 8841-4781 [employer]. Care instructions adapted under license by [employer] (which disclaims liability or warranty for this information). If you have questions about a medical condition or this instruction, always ask your healthcare professional. [place] [address] any warranty or liability for your use of this information.

## 2018-12-12 NOTE — H&P
History & Physical    Name: Rory Sandhoff MRN: [id number]  SSN: [id number]    YOB: 1990  Age: 29 y.o. Sex: female      Subjective:     Reason for Admission:  Pregnancy and vomiting, possible food     History of Present Illness: Ms. Suhas Cobb is a 29 y.o.  female with an estimated gestational age of 29w1d with Estimated Date of Delivery: 3/5/19. Patient complains of mild contractions and severe nausea/vomiting for 1 days. Pregnancy has been complicated by nausea and vomiting. Patient denies abdominal pain  , fever, pelvic pressure, right upper quadrant pain  , shortness of breath, vaginal bleeding  and vaginal leaking of fluid .     OB History    Para Term  AB Living   1 0 0 0 0 0   SAB TAB Ectopic Molar Multiple Live Births   0 0 0   0        # Outcome Date GA Lbr Geraldo/2nd Weight Sex Delivery Anes PTL Lv   1 Current                 Past Medical History:   Diagnosis Date    Abnormal Papanicolaou smear of cervix     Anemia     Dysmenorrhea     better on OCP    Hx of abnormal Pap smear 13 neg pap, +HPV neg 16 and18;2012 ASCUS pap,+HPV; 2011 neg pap, +HPV neg 16 and 18    Psychiatric problem      Past Surgical History:   Procedure Laterality Date    HX COLPOSCOPY  4/15/13    4/13 neg;3/12 [facility] I endo and ecto;3/11 [facility] I endo and ecto     Social History     Occupational History    Not on file   Tobacco Use    Smoking status: Never Smoker    Smokeless tobacco: Never Used   Substance and Sexual Activity    Alcohol use: Yes     Comment: none with +UPT    Drug use: No    Sexual activity: Yes     Partners: Male     Birth control/protection: None      Family History   Problem Relation Age of Onset    Diabetes Paternal Aunt     Hypertension Maternal Grandmother     Diabetes Maternal Grandmother     No Known Problems Mother     No Known Problems Father     Diabetes Maternal Grandfather     No Known Problems Paternal Grandmother     No Known Problems Paternal Grandfather No Known Allergies  Prior to Admission medications    Medication Sig Start Date End Date Taking? Authorizing Provider   Prenatal Vit27&Calcium-Iron-FA 60 mg iron-1 mg tab Take  by mouth. Yes Provider, Historical   acetaminophen (TYLENOL) 325 mg cap Take  by mouth. Yes Provider, Historical        Review of Systems:  A comprehensive review of systems was negative except for that written in the History of Present Illness. Objective:     Vitals:    Vitals:    18 0332 18 0337   BP: 114/79    Pulse: 90    Resp: 17    Temp: 98.5 °F (36.9 °C)    Weight:  63 kg (139 lb)   Height:  5' 2\" (1.575 m)      Temp (24hrs), Av.5 °F (36.9 °C), Min:98.5 °F (36.9 °C), Max:98.5 °F (36.9 °C)    BP  Min: 114/79  Max: 114/79     Physical Exam:  Patient with distress. Heart: Regular rate and rhythm  Lung: clear to auscultation throughout lung fields, no wheezes, no rales, no rhonchi and normal respiratory effort  Abdomen: soft, nontender  Cervical Exam: Closed/Thick/High     Membranes:  Intact  Uterine Activity:  None  Fetal Heart Rate:  Reactive       Lab/Data Review:  Recent Results (from the past 24 hour(s))   CBC WITH AUTOMATED DIFF    Collection Time: 18  3:56 AM   Result Value Ref Range    WBC 14.3 (H) 4.3 - 11.1 K/uL    RBC 4.50 4.05 - 5.2 M/uL    HGB 14.0 11.7 - 15.4 g/dL    HCT 40.4 35.8 - 46.3 %    MCV 89.8 79.6 - 97.8 FL    MCH 31.1 26.1 - 32.9 PG    MCHC 34.7 31.4 - 35.0 g/dL    RDW 11.9 11.9 - 14.6 %    PLATELET 358 660 - 350 K/uL    MPV 11.5 9.4 - 12.3 FL    ABSOLUTE NRBC 0.00 0.0 - 0.2 K/uL    DF AUTOMATED      NEUTROPHILS 90 (H) 43 - 78 %    LYMPHOCYTES 4 (L) 13 - 44 %    MONOCYTES 5 4.0 - 12.0 %    EOSINOPHILS 0 (L) 0.5 - 7.8 %    BASOPHILS 0 0.0 - 2.0 %    IMMATURE GRANULOCYTES 1 0.0 - 5.0 %    ABS. NEUTROPHILS 12.9 (H) 1.7 - 8.2 K/UL    ABS. LYMPHOCYTES 0.6 0.5 - 4.6 K/UL    ABS. MONOCYTES 0.7 0.1 - 1.3 K/UL    ABS. EOSINOPHILS 0.0 0.0 - 0.8 K/UL    ABS.  BASOPHILS 0.0 0.0 - 0.2 K/UL ABS. IMM. GRANS. 0.1 0.0 - 0.5 K/UL   METABOLIC PANEL, COMPREHENSIVE    Collection Time: 18  3:56 AM   Result Value Ref Range    Sodium 140 136 - 145 mmol/L    Potassium 3.6 3.5 - 5.1 mmol/L    Chloride 106 98 - 107 mmol/L    CO2 23 21 - 32 mmol/L    Anion gap 11 mmol/L    Glucose 94 65 - 100 mg/dL    BUN 9 6 - 23 MG/DL    Creatinine 0.75 0.6 - 1.0 MG/DL    GFR est AA >60 >60 ml/min/1.73m2    GFR est non-AA >60 ml/min/1.73m2    Calcium 8.0 (L) 8.3 - 10.4 MG/DL    Bilirubin, total 0.5 0.2 - 1.1 MG/DL    ALT (SGPT) 34 12 - 65 U/L    AST (SGOT) 30 15 - 37 U/L    Alk. phosphatase 126 50 - 136 U/L    Protein, total 6.6 g/dL    Albumin 2.8 (L) 3.5 - 5.0 g/dL    Globulin 3.8 (H) 2.3 - 3.5 g/dL    A-G Ratio 0.7         Assessment and Plan: Active Problems:    Nausea and vomiting (2018)       uterine contractions in third trimester, antepartum (2018)      Antepartum dehydration (2018)       - Hyperemesis Gravidarum:  Antiemetic Therapy including Phenergan/Zofran/Reglan/Zantac  Aggressive intravenous hydration  Dietary consult  -  Labor:  IVF have stopped the ctx.       Signed By:  Tiffany Pallas, MD     2018

## 2018-12-12 NOTE — DISCHARGE SUMMARY
Obstetrical Discharge Summary     Name: Sharla Curiel MRN: [id number]  SSN: [id number]    YOB: 1990  Age: 29 y.o. Sex: female      Allergies: Patient has no known allergies. Admit Date: 2018    Discharge Date: 2018     Admitting Physician: Sherry Carbone MD     Attending Physician:  Jeanette Love MD     * Admission Diagnoses: ABD PAIN   uterine contractions in third trimester, antepartum  Nausea and vomiting  Antepartum dehydration   uterine contractions in third trimester, antepartum  Antepartum dehydration  Nausea and vomiting    * Discharge Diagnoses: This patient has no babies on file. Additional Diagnoses:   Hospital Problems as of 2018 Date Reviewed: 2018          Codes Class Noted - Resolved POA    Nausea and vomiting ICD-10-CM: R11.2  ICD-9-CM: 787.01  2018 - Present Unknown         uterine contractions in third trimester, antepartum ICD-10-CM: O47.03  ICD-9-CM: 644.03  2018 - Present Unknown        Antepartum dehydration ICD-10-CM: O26.899, E86.0  ICD-9-CM: 646.83, 276.51  2018 - Present Unknown             Lab Results   Component Value Date/Time    Rubella, External immune 2018    ABO,Rh O positive 2018      There is no immunization history on file for this patient. * Procedures: none  * No surgery found *           * Discharge Condition: good and stable    * Hospital Course: Admitted for P/E work up. Labs stable, awaiting the 24 hour urine, after done, sending home on the Labetalol increased, bedrest and f/u Monday,      * Disposition: Home    Discharge Medications:   Current Discharge Medication List          * Follow-up Care/Patient Instructions:   Activity: Bedrest  Diet: Regular Diet  Wound Care: None needed    Follow-up Information    None          Signed By:  Arthur Landon MD     2018                    Ante Partum High Risk Pregnancy Note    Patient admitted for chronic hypertension and with elevated BP and feeling bad, for P/E work up. states she does not  have  headache , right upper quadrant pain  , vaginal bleeding  and vaginal leaking of fluid . LOS:  One day  Vitals: Temp (24hrs), Av.5 °F (36.9 °C), Min:98.5 °F (36.9 °C), Max:98.5 °F (36.9 °C)     Patient Vitals for the past 24 hrs:   BP   18 0332 114/79       I&O:   No intake/output data recorded. No intake/output data recorded. Exam:  Patient without distress. Abdomen: soft, non-tender               Fundus: soft and non tender               Fundal Height: 37 cm               Right Upper Quadrant: non-tender               Perineum: No sign of blood or amniotic fluid               Lower Extremities: 1+               Patellar Reflexes: 1+ bilaterally               Clonus: absent               Fetal Monitoring:  Accelerations: Reactive   Uterine Activity: None                 NST:  reactive           Lab/Data Review:  CMP:   Lab Results   Component Value Date/Time     2018 03:56 AM    K 3.6 2018 03:56 AM     2018 03:56 AM    CO2 23 2018 03:56 AM    AGAP 11 2018 03:56 AM    GLU 94 2018 03:56 AM    BUN 9 2018 03:56 AM    CREA 0.75 2018 03:56 AM    GFRAA >60 2018 03:56 AM    GFRNA >60 2018 03:56 AM    CA 8.0 (L) 2018 03:56 AM    ALB 2.8 (L) 2018 03:56 AM    TP 6.6 2018 03:56 AM    GLOB 3.8 (H) 2018 03:56 AM    AGRAT 0.7 2018 03:56 AM    SGOT 30 2018 03:56 AM    ALT 34 2018 03:56 AM     CBC:   Lab Results   Component Value Date/Time    WBC 14.3 (H) 2018 03:56 AM    HGB 14.0 2018 03:56 AM    HCT 40.4 2018 03:56 AM     2018 03:56 AM       Assessment and Plan:       Active Problems:    Nausea and vomiting (2018)       uterine contractions in third trimester, antepartum (2018)      Antepartum dehydration (12/12/2018)          Pregnancy-Induced Hypertension:  Medication change: increased her Labetalol  PIH precautions  Antepartum testing  Bed rest  Fetal movement instructions

## 2018-12-13 ENCOUNTER — HOSPITAL ENCOUNTER (OUTPATIENT)
Age: 28
Discharge: HOME | End: 2018-12-13
Attending: OBSTETRICS & GYNECOLOGY | Admitting: OBSTETRICS & GYNECOLOGY
Payer: COMMERCIAL

## 2018-12-13 VITALS — HEART RATE: 85 BPM | TEMPERATURE: 98 F | SYSTOLIC BLOOD PRESSURE: 108 MMHG | DIASTOLIC BLOOD PRESSURE: 70 MMHG

## 2018-12-13 PROBLEM — O36.8130 DECREASED FETAL MOVEMENT AFFECTING MANAGEMENT OF PREGNANCY IN THIRD TRIMESTER: Status: ACTIVE | Noted: 2018-12-13

## 2018-12-13 PROCEDURE — 99281 EMR DPT VST MAYX REQ PHY/QHP: CPT | Performed by: OBSTETRICS & GYNECOLOGY

## 2018-12-13 NOTE — DISCHARGE INSTRUCTIONS
Counting Your Baby's Kicks: Care Instructions  Your Care Instructions    Counting your baby's kicks is one way your doctor can tell that your baby is healthy. Most women--especially in a first pregnancy--feel their baby move for the first time between 16 and 22 weeks. The movement may feel like flutters rather than kicks. Your baby may move more at certain times of the day. When you are active, you may notice less kicking than when you are resting. At your prenatal visits, your doctor will ask whether the baby is active. In your last trimester, your doctor may ask you to count the number of times you feel your baby move. Follow-up care is a key part of your treatment and safety. Be sure to make and go to all appointments, and call your doctor if you are having problems. It's also a good idea to know your test results and keep a list of the medicines you take. How do you count fetal kicks? · A common method of checking your baby's movement is to count the number of kicks or moves you feel in 1 hour. Ten movements (such as kicks, flutters, or rolls) in 1 hour are normal. Some doctors suggest that you count in the morning until you get to 10 movements. Then you can quit for that day and start again the next day. · Pick your baby's most active time of day to count. This may be any time from morning to evening. · If you do not feel 10 movements in an hour, your baby may be sleeping. Wait for the next hour and count again. When should you call for help? Call your doctor now or seek immediate medical care if:    · You noticed that your baby has stopped moving or is moving much less than normal.    Watch closely for changes in your health, and be sure to contact your doctor if you have any problems. Where can you learn more? Go to http://sha-jerardo.info/. Enter Z570 in the search box to learn more about \"Counting Your Baby's Kicks: Care Instructions. \"  Current as of: November 21, 2017  Content Version: 11.8  © 1532-3059 [employer]. Care instructions adapted under license by [employer] (which disclaims liability or warranty for this information). If you have questions about a medical condition or this instruction, always ask your healthcare professional. [address] any warranty or liability for your use of this information. Pregnancy Precautions: Care Instructions  Your Care Instructions    There is no sure way to prevent labor before your due date ( labor) or to prevent most other pregnancy problems. But there are things you can do to increase your chances of a healthy pregnancy. Go to your appointments, follow your doctor's advice, and take good care of yourself. Eat well, and exercise (if your doctor agrees). And make sure to drink plenty of water. Follow-up care is a key part of your treatment and safety. Be sure to make and go to all appointments, and call your doctor if you are having problems. It's also a good idea to know your test results and keep a list of the medicines you take. How can you care for yourself at home? · Make sure you go to your prenatal appointments. At each visit, your doctor will check your blood pressure. Your doctor will also check to see if you have protein in your urine. High blood pressure and protein in urine are signs of preeclampsia. This condition can be dangerous for you and your baby. · Drink plenty of fluids, enough so that your urine is light yellow or clear like water. Dehydration can cause contractions. If you have kidney, heart, or liver disease and have to limit fluids, talk with your doctor before you increase the amount of fluids you drink. · Tell your doctor right away if you notice any symptoms of an infection, such as:  ? Burning when you urinate. ? A foul-smelling discharge from your vagina. ? Vaginal itching. ? Unexplained fever. ?  Unusual pain or soreness in your uterus or lower belly. · Eat a balanced diet. Include plenty of foods that are high in calcium and iron. ? Foods high in calcium include milk, cheese, yogurt, almonds, and broccoli. ? Foods high in iron include red meat, shellfish, poultry, eggs, beans, raisins, whole-grain bread, and leafy green vegetables. · Do not smoke. If you need help quitting, talk to your doctor about stop-smoking programs and medicines. These can increase your chances of quitting for good. · Do not drink alcohol or use illegal drugs. · Follow your doctor's directions about activity. Your doctor will let you know how much, if any, exercise you can do. · Ask your doctor if you can have sex. If you are at risk for early labor, your doctor may ask you to not have sex. · Take care to prevent falls. During pregnancy, your joints are loose, and your balance is off. Sports such as bicycling, skiing, or in-line skating can increase your risk of falling. And don't ride horses or motorcycles, dive, water ski, scuba dive, or parachute jump while you are pregnant. · Avoid getting very hot. Do not use saunas or hot tubs. Avoid staying out in the sun in hot weather for long periods. Take acetaminophen (Tylenol) to lower a high fever. · Do not take any over-the-counter or herbal medicines or supplements without talking to your doctor or pharmacist first.  When should you call for help? Call 911 anytime you think you may need emergency care. For example, call if:    · You passed out (lost consciousness).     · You have severe vaginal bleeding.     · You have severe pain in your belly or pelvis.     · You have had fluid gushing or leaking from your vagina and you know or think the umbilical cord is bulging into your vagina. If this happens, immediately get down on your knees so your rear end (buttocks) is higher than your head.  This will decrease the pressure on the cord until help arrives.   [facility] your doctor now or seek immediate medical care if:    · You have signs of preeclampsia, such as:  ? Sudden swelling of your face, hands, or feet. ? New vision problems (such as dimness or blurring). ? A severe headache.     · You have any vaginal bleeding.     · You have belly pain or cramping.     · You have a fever.     · You have had regular contractions (with or without pain) for an hour. This means that you have 8 or more within 1 hour or 4 or more in 20 minutes after you change your position and drink fluids.     · You have a sudden release of fluid from your vagina.     · You have low back pain or pelvic pressure that does not go away.     · You notice that your baby has stopped moving or is moving much less than normal.    Watch closely for changes in your health, and be sure to contact your doctor if you have any problems. Where can you learn more? Go to http://sha-jerardo.info/. Enter [id number] in the search box to learn more about \"Pregnancy Precautions: Care Instructions. \"  Current as of: November 21, 2017  Content Version: 11.8  © 8706-9205 [employer]. Care instructions adapted under license by [employer] (which disclaims liability or warranty for this information). If you have questions about a medical condition or this instruction, always ask your healthcare professional. [address] any warranty or liability for your use of this information.

## 2018-12-13 NOTE — PROGRESS NOTES
Pt tolerated crackers and fluids well. Discharged to home.  Given discharge instructions Ambulated downstairs with

## 2018-12-13 NOTE — H&P
CC  Chief Complaint   Patient presents with    Decreased Fetal Movement       History:    29 y.o. female at 28w2d weeks gestation who requesting evaluation for Decreased fetal movement: states she had decreased fm since leaving [place] last night when treated for \"food poisoning. \" states baby has started to move since she arrived. No nausea now--\"starving. \" has not thrown up since last night. PNC: uncomplicated    HISTORY:  OB History    Para Term  AB Living   1 0 0 0 0 0   SAB TAB Ectopic Molar Multiple Live Births   0 0 0   0        # Outcome Date GA Lbr Geraldo/2nd Weight Sex Delivery Anes PTL Lv   1 Current                   Social History     Substance and Sexual Activity   Sexual Activity Yes    Partners: Male    Birth control/protection: None     Patient's last menstrual period was 2018 (exact date).     Social History     Socioeconomic History    Marital status:      Spouse name: Malka Chaudhry    Number of children: 0    Years of education: masters    Highest education level: Master's degree (e.g., MA, MS, Amy, MEd, MSW, [employer])   Social Needs    Financial resource strain: Not very hard    Food insecurity - worry: Never true    Food insecurity - inability: Never true   [employer] needs - medical: No    Transportation needs - non-medical: No   Occupational History    Not on file   Tobacco Use    Smoking status: Never Smoker    Smokeless tobacco: Never Used   Substance and Sexual Activity    Alcohol use: Yes     Comment: none with +UPT    Drug use: No    Sexual activity: Yes     Partners: Male     Birth control/protection: None   Other Topics Concern     Service Not Asked    Blood Transfusions Not Asked    Caffeine Concern Not Asked    Occupational Exposure Not Asked    Hobby Hazards Not Asked    Sleep Concern Not Asked    Stress Concern Not Asked    Weight Concern Not Asked    Special Diet Not Asked    Back Care Not Asked    Exercise Not Asked    Bike Helmet Not Asked    Seat Belt Not Asked    Self-Exams Not Asked   Social History Narrative    Not on file       Past Surgical History:   Procedure Laterality Date    HX COLPOSCOPY  4/15/13    4/13 neg;3/12 [facility] I endo and ecto;3/11 [facility] I endo and ecto       Past Medical History:   Diagnosis Date    Abnormal Papanicolaou smear of cervix     Anemia     Dysmenorrhea     better on OCP    Hx of abnormal Pap smear 2/27/13 2/13 neg pap, +HPV neg 16 and18;2/2012 ASCUS pap,+HPV; 2/2011 neg pap, +HPV neg 16 and 18    Psychiatric problem        ROS:  Neg 10 point ros other than noted in hpi    PHYSICAL EXAM:  Temperature 98 °F (36.7 °C), last menstrual period 05/29/2018. General: healthy  Resp:  breath sounds clear and equal bilaterally  Card:  RRR, no MRG  Abd: soft, nt. Fetal Assessment: Baseline FHR: 130s per minute     Fetal heart variability: moderate     Fetal Heart Rate decelerations: none     Fetal Heart Rate accelerations: no     Prestentation: vertex by exam  Pelvic:  deferred  Ext: no edema, DTR's normal  Assessment:  29 y.o. female at 28w2d weeks gestation with decreased fetal movement  appropriate for gest age tracing. Plan:  will provide po fluids and snacks. if tolerates then discharge., Discharge home with routine labor instructions and warning signs, Keep follow up appointment with regular provider as scheduled, Instructed in fetal activity monitoring    Kaci Veliz MD

## 2018-12-13 NOTE — PROGRESS NOTES
Pt to [facility] with c/o fever, cough and decreased fetal movement. Pt and her spouse state \"since leaving here this morning we have not felt the baby move, can you do an ultrasound\"? Pt also states that she needs food. She states \"I ate one whole pice of toast since I left here\". \"I was trying to take it easy so I didn't vomit\". Pt denies using RX medications that she was discharged with (Zofran and Phenergan) even though she admits picking them up from the pharmacy.    Dr. Shey Diggs notified of pts arrival.

## 2018-12-28 ENCOUNTER — ROUTINE PRENATAL (OUTPATIENT)
Dept: OBGYN CLINIC | Age: 28
End: 2018-12-28

## 2018-12-28 ENCOUNTER — TELEPHONE (OUTPATIENT)
Dept: OBGYN CLINIC | Age: 28
End: 2018-12-28

## 2018-12-28 VITALS
DIASTOLIC BLOOD PRESSURE: 64 MMHG | BODY MASS INDEX: 24.99 KG/M2 | HEIGHT: 62 IN | WEIGHT: 135.8 LBS | SYSTOLIC BLOOD PRESSURE: 116 MMHG

## 2018-12-28 DIAGNOSIS — M54.9 BACK PAIN IN PREGNANCY: ICD-10-CM

## 2018-12-28 DIAGNOSIS — O99.891 BACK PAIN IN PREGNANCY: ICD-10-CM

## 2018-12-28 DIAGNOSIS — N89.8 VAGINAL DISCHARGE DURING PREGNANCY IN THIRD TRIMESTER: ICD-10-CM

## 2018-12-28 DIAGNOSIS — O26.893 VAGINAL DISCHARGE DURING PREGNANCY IN THIRD TRIMESTER: ICD-10-CM

## 2018-12-28 DIAGNOSIS — O26.899 PELVIC PAIN IN PREGNANCY: Primary | ICD-10-CM

## 2018-12-28 DIAGNOSIS — R10.2 PELVIC PAIN IN PREGNANCY: Primary | ICD-10-CM

## 2018-12-28 LAB
FERN TEST, (POC): NORMAL
WET MOUNT POCT, WMPOCT: NORMAL

## 2018-12-28 RX ORDER — DIPHENHYDRAMINE HCL 25 MG
25 TABLET ORAL
COMMUNITY

## 2018-12-28 NOTE — TELEPHONE ENCOUNTER
Call received at 817am    29year old patient last seen in the office on 5/15/17.  30w3d pregnant patient being followed in [facility]. Patient calling to say that she wants to be checked. Patient denies vaginal bleeding, ROM and reports positive fetal movement. Patient reports having abdominal discomfort, abdomen tightening and pain in lower back . Patient reports the discomfort was consistent all night at 7 and is slightly improved this am. Patient reports the contractions are further apart this am. Patient placed on the schedule to be seen at 11;00am ok per . Patient verbalized understanding.

## 2018-12-28 NOTE — PATIENT INSTRUCTIONS
Weeks 30 to 32 of Your Pregnancy: Care Instructions  Your Care Instructions    You have made it to the final months of your pregnancy. By now, your baby is really starting to look like a baby, with hair and plump skin. As you enter the final weeks of pregnancy, the reality of having a baby may start to set in. This is the time to settle on a name, get your household in order, set up a safe nursery, and find quality  if needed. Doing these things in advance will allow you to focus on caring for and enjoying your new baby. You may also want to have a tour of your hospital's labor and delivery unit to get a better idea of what to expect while you are in the hospital.  During these last months, it is very important to take good care of yourself and pay attention to what your body needs. If your doctor says it is okay for you to work, don't push yourself too hard. Use the tips provided in this care sheet to ease heartburn and care for varicose veins. If you haven't already had the Tdap shot during this pregnancy, talk to your doctor about getting it. It will help protect your  against pertussis infection. Follow-up care is a key part of your treatment and safety. Be sure to make and go to all appointments, and call your doctor if you are having problems. It's also a good idea to know your test results and keep a list of the medicines you take. How can you care for yourself at home? Pay attention to your baby's movements  · You should feel your baby move several times every day. · Your baby now turns less, and kicks and jabs more. · Your baby sleeps 20 to 45 minutes at a time and is more active at certain times of day. · If your doctor wants you to count your baby's kicks:  ? Empty your bladder, and lie on your side or relax in a comfortable chair. ? Write down your start time. ? Pay attention only to your baby's movements. Count any movement except hiccups. ?  After you have counted 10 movements, write down your stop time. ? Write down how many minutes it took for your baby to move 10 times. ? If an hour goes by and you have not recorded 10 movements, have something to eat or drink and then count for another hour. If you do not record 10 movements in either hour, call your doctor. Ease heartburn  · Eat small, frequent meals. · Do not eat chocolate, peppermint, or very spicy foods. Avoid drinks with caffeine, such as coffee, tea, and sodas. · Avoid bending over or lying down after meals. · Talk a short walk after you eat. · If heartburn is a problem at night, do not eat for 2 hours before bedtime. · Take antacids like Mylanta, Maalox, Rolaids, or Tums. Do not take antacids that have sodium bicarbonate. Care for varicose veins  · Varicose veins are blood vessels that stretch out with the extra blood during pregnancy. Your legs may ache or throb. Most varicose veins will go away after the birth. · Avoid standing for long periods of time. Sit with your legs crossed at the ankles, not the knees. · Sit with your feet propped up. · Avoid tight clothing or stockings. Wear support hose. · Exercise regularly. Try walking for at least 30 minutes a day. Where can you learn more? Go to http://sha-jerardo.info/. Enter V977 in the search box to learn more about \"Weeks 30 to 32 of Your Pregnancy: Care Instructions. \"  Current as of: November 21, 2017  Content Version: 11.8  © 7211-7704 [employer]. Care instructions adapted under license by [employer] (which disclaims liability or warranty for this information). If you have questions about a medical condition or this instruction, always ask your healthcare professional. [place] [address] any warranty or liability for your use of this information. [place]  Contractions: Care Instructions  Your Care Instructions    Jasbir Cazares contractions prepare your uterus for labor. Think of them as a \"warm-up\" exercise that your body does. You may begin to feel them between the 28th and 30th weeks of your pregnancy. But they start as early as the 20th week. Jasbir Cazares contractions usually occur more often during the ninth month. They may go away when you are active and return when you rest. These contractions are like mild contractions of true labor, but they occur less often. (You feel fewer than 8 in an hour.) They don't cause your cervix to open. It may be hard for you to tell the difference between [facility] contractions and true labor, especially in your first pregnancy. Follow-up care is a key part of your treatment and safety. Be sure to make and go to all appointments, and call your doctor if you are having problems. It's also a good idea to know your test results and keep a list of the medicines you take. How can you care for yourself at home? · Try a warm bath to help relieve muscle tension and reduce pain. · Change positions every 30 minutes. Take breaks if you must sit for a long time. Get up and walk around. · Drink plenty of water, enough so that your urine is light yellow or clear like water. · Taking short walks may help you feel better. Your doctor needs to check any contractions that are getting stronger or closer together. Where can you learn more? Go to http://sha-jerardo.info/. Enter [id number] in the search box to learn more about \"[place] Contractions: Care Instructions. \"  Current as of: November 21, 2017  Content Version: 11.8  © 4581-9720 [employer]. Care instructions adapted under license by [employer] (which disclaims liability or warranty for this information). If you have questions about a medical condition or this instruction, always ask your healthcare professional. [address] any warranty or liability for your use of this information.
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
## 2018-12-30 LAB — FIBRONECTIN FETAL VAG QL: NEGATIVE

## 2018-12-31 LAB
A VAGINAE DNA VAG QL NAA+PROBE: NORMAL SCORE
BVAB2 DNA VAG QL NAA+PROBE: NORMAL SCORE
C ALBICANS DNA VAG QL NAA+PROBE: NEGATIVE
C GLABRATA DNA VAG QL NAA+PROBE: NEGATIVE
C TRACH RRNA SPEC QL NAA+PROBE: NEGATIVE
MEGA1 DNA VAG QL NAA+PROBE: NORMAL SCORE
N GONORRHOEA RRNA SPEC QL NAA+PROBE: NEGATIVE
T VAGINALIS RRNA SPEC QL NAA+PROBE: NEGATIVE

## 2019-01-04 ENCOUNTER — HOSPITAL ENCOUNTER (OUTPATIENT)
Age: 29
Discharge: HOME | End: 2019-01-04
Attending: OBSTETRICS & GYNECOLOGY | Admitting: OBSTETRICS & GYNECOLOGY
Payer: COMMERCIAL

## 2019-01-04 VITALS
TEMPERATURE: 98.6 F | WEIGHT: 142 LBS | HEART RATE: 101 BPM | HEIGHT: 62 IN | DIASTOLIC BLOOD PRESSURE: 73 MMHG | SYSTOLIC BLOOD PRESSURE: 118 MMHG | RESPIRATION RATE: 18 BRPM | BODY MASS INDEX: 26.13 KG/M2

## 2019-01-04 PROBLEM — O9A.213 TRAUMATIC INJURY DURING PREGNANCY, ANTEPARTUM, THIRD TRIMESTER: Status: ACTIVE | Noted: 2019-01-04

## 2019-01-04 PROCEDURE — 76815 OB US LIMITED FETUS(S): CPT

## 2019-01-04 PROCEDURE — 99282 EMERGENCY DEPT VISIT SF MDM: CPT

## 2019-01-04 PROCEDURE — 59025 FETAL NON-STRESS TEST: CPT

## 2019-01-04 NOTE — PROGRESS NOTES
Discharge instructions given to pt; pt verbalized understanding and denies questions at this time; pt to ambulate out without assistance.

## 2019-01-04 NOTE — PROGRESS NOTES
nst - 135- accels, no decels, reactive            2 
 
halie = 17, vertex, post placenta grade 1 2 Movement     2 Tone      2 Breathing BPP  10/10     2

## 2019-01-04 NOTE — PROGRESS NOTES
Pt here in triage with complaints of \"a shelf falling on me in [employer]\" pt states the shelf was falling and she tried to shield her abdomen with her left arm. Pt wanted to get checked out to make sure baby was ok. Pt reports +FM, no LOF, and no vaginal bleeding.  Will notify Dr. Brandi Allan of pt arrival.
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
## 2019-01-04 NOTE — DISCHARGE INSTRUCTIONS
Patient Education   Patient Education        Pregnancy Precautions: Care Instructions  Your Care Instructions    There is no sure way to prevent labor before your due date ( labor) or to prevent most other pregnancy problems. But there are things you can do to increase your chances of a healthy pregnancy. Go to your appointments, follow your doctor's advice, and take good care of yourself. Eat well, and exercise (if your doctor agrees). And make sure to drink plenty of water. Follow-up care is a key part of your treatment and safety. Be sure to make and go to all appointments, and call your doctor if you are having problems. It's also a good idea to know your test results and keep a list of the medicines you take. How can you care for yourself at home? · Make sure you go to your prenatal appointments. At each visit, your doctor will check your blood pressure. Your doctor will also check to see if you have protein in your urine. High blood pressure and protein in urine are signs of preeclampsia. This condition can be dangerous for you and your baby. · Drink plenty of fluids, enough so that your urine is light yellow or clear like water. Dehydration can cause contractions. If you have kidney, heart, or liver disease and have to limit fluids, talk with your doctor before you increase the amount of fluids you drink. · Tell your doctor right away if you notice any symptoms of an infection, such as:  ? Burning when you urinate. ? A foul-smelling discharge from your vagina. ? Vaginal itching. ? Unexplained fever. ? Unusual pain or soreness in your uterus or lower belly. · Eat a balanced diet. Include plenty of foods that are high in calcium and iron. ? Foods high in calcium include milk, cheese, yogurt, almonds, and broccoli. ? Foods high in iron include red meat, shellfish, poultry, eggs, beans, raisins, whole-grain bread, and leafy green vegetables. · Do not smoke.  If you need help quitting, talk to your doctor about stop-smoking programs and medicines. These can increase your chances of quitting for good. · Do not drink alcohol or use illegal drugs. · Follow your doctor's directions about activity. Your doctor will let you know how much, if any, exercise you can do. · Ask your doctor if you can have sex. If you are at risk for early labor, your doctor may ask you to not have sex. · Take care to prevent falls. During pregnancy, your joints are loose, and your balance is off. Sports such as bicycling, skiing, or in-line skating can increase your risk of falling. And don't ride horses or motorcycles, dive, water ski, scuba dive, or parachute jump while you are pregnant. · Avoid getting very hot. Do not use saunas or hot tubs. Avoid staying out in the sun in hot weather for long periods. Take acetaminophen (Tylenol) to lower a high fever. · Do not take any over-the-counter or herbal medicines or supplements without talking to your doctor or pharmacist first.  When should you call for help? Call 911 anytime you think you may need emergency care. For example, call if:    · You passed out (lost consciousness).     · You have severe vaginal bleeding.     · You have severe pain in your belly or pelvis.     · You have had fluid gushing or leaking from your vagina and you know or think the umbilical cord is bulging into your vagina. If this happens, immediately get down on your knees so your rear end (buttocks) is higher than your head. This will decrease the pressure on the cord until help arrives.   [facility] your doctor now or seek immediate medical care if:    · You have signs of preeclampsia, such as:  ? Sudden swelling of your face, hands, or feet. ? New vision problems (such as dimness or blurring). ? A severe headache.     · You have any vaginal bleeding.     · You have belly pain or cramping.     · You have a fever.     · You have had regular contractions (with or without pain) for an hour.  This means that you have 8 or more within 1 hour or 4 or more in 20 minutes after you change your position and drink fluids.     · You have a sudden release of fluid from your vagina.     · You have low back pain or pelvic pressure that does not go away.     · You notice that your baby has stopped moving or is moving much less than normal.    Watch closely for changes in your health, and be sure to contact your doctor if you have any problems. Where can you learn more? Go to http://sha-jerardo.info/. Enter [id number] in the search box to learn more about \"Pregnancy Precautions: Care Instructions. \"  Current as of: November 21, 2017  Content Version: 11.8  © 3087-5196 [employer]. Care instructions adapted under license by [employer] (which disclaims liability or warranty for this information). If you have questions about a medical condition or this instruction, always ask your healthcare professional. [place] [address] any warranty or liability for your use of this information. Weeks 30 to 32 of Your Pregnancy: Care Instructions  Your Care Instructions    You have made it to the final months of your pregnancy. By now, your baby is really starting to look like a baby, with hair and plump skin. As you enter the final weeks of pregnancy, the reality of having a baby may start to set in. This is the time to settle on a name, get your household in order, set up a safe nursery, and find quality  if needed. Doing these things in advance will allow you to focus on caring for and enjoying your new baby. You may also want to have a tour of your hospital's labor and delivery unit to get a better idea of what to expect while you are in the hospital.  During these last months, it is very important to take good care of yourself and pay attention to what your body needs. If your doctor says it is okay for you to work, don't push yourself too hard.  Use the tips provided in this care sheet to ease heartburn and care for varicose veins. If you haven't already had the Tdap shot during this pregnancy, talk to your doctor about getting it. It will help protect your  against pertussis infection. Follow-up care is a key part of your treatment and safety. Be sure to make and go to all appointments, and call your doctor if you are having problems. It's also a good idea to know your test results and keep a list of the medicines you take. How can you care for yourself at home? Pay attention to your baby's movements  · You should feel your baby move several times every day. · Your baby now turns less, and kicks and jabs more. · Your baby sleeps 20 to 45 minutes at a time and is more active at certain times of day. · If your doctor wants you to count your baby's kicks:  ? Empty your bladder, and lie on your side or relax in a comfortable chair. ? Write down your start time. ? Pay attention only to your baby's movements. Count any movement except hiccups. ? After you have counted 10 movements, write down your stop time. ? Write down how many minutes it took for your baby to move 10 times. ? If an hour goes by and you have not recorded 10 movements, have something to eat or drink and then count for another hour. If you do not record 10 movements in either hour, call your doctor. Ease heartburn  · Eat small, frequent meals. · Do not eat chocolate, peppermint, or very spicy foods. Avoid drinks with caffeine, such as coffee, tea, and sodas. · Avoid bending over or lying down after meals. · Talk a short walk after you eat. · If heartburn is a problem at night, do not eat for 2 hours before bedtime. · Take antacids like Mylanta, Maalox, Rolaids, or Tums. Do not take antacids that have sodium bicarbonate. Care for varicose veins  · Varicose veins are blood vessels that stretch out with the extra blood during pregnancy. Your legs may ache or throb.  Most varicose veins will go away after the birth. · Avoid standing for long periods of time. Sit with your legs crossed at the ankles, not the knees. · Sit with your feet propped up. · Avoid tight clothing or stockings. Wear support hose. · Exercise regularly. Try walking for at least 30 minutes a day. Where can you learn more? Go to http://sha-jerardo.info/. Enter E771 in the search box to learn more about \"Weeks 30 to 32 of Your Pregnancy: Care Instructions. \"  Current as of: November 21, 2017  Content Version: 11.8  © 7932-4408 [employer]. Care instructions adapted under license by [employer] (which disclaims liability or warranty for this information). If you have questions about a medical condition or this instruction, always ask your healthcare professional. [address] any warranty or liability for your use of this information.

## 2019-01-19 ENCOUNTER — HOSPITAL ENCOUNTER (OUTPATIENT)
Age: 29
Discharge: HOME | End: 2019-01-19
Attending: OBSTETRICS & GYNECOLOGY | Admitting: OBSTETRICS & GYNECOLOGY
Payer: COMMERCIAL

## 2019-01-19 VITALS
HEART RATE: 77 BPM | BODY MASS INDEX: 25.4 KG/M2 | HEIGHT: 62 IN | WEIGHT: 138 LBS | SYSTOLIC BLOOD PRESSURE: 106 MMHG | DIASTOLIC BLOOD PRESSURE: 70 MMHG

## 2019-01-19 DIAGNOSIS — Z3A.33 33 WEEKS GESTATION OF PREGNANCY: ICD-10-CM

## 2019-01-19 PROBLEM — O16.3 HYPERTENSION AFFECTING PREGNANCY, THIRD TRIMESTER: Status: ACTIVE | Noted: 2019-01-19

## 2019-01-19 LAB
GLUCOSE, GLUUPC: NEGATIVE
KETONES UR-MCNC: NEGATIVE MG/DL
PROT UR QL: NEGATIVE

## 2019-01-19 PROCEDURE — 99285 EMERGENCY DEPT VISIT HI MDM: CPT

## 2019-01-19 PROCEDURE — 81002 URINALYSIS NONAUTO W/O SCOPE: CPT | Performed by: OBSTETRICS & GYNECOLOGY

## 2019-01-19 NOTE — ED PROVIDER NOTES
Chief Complaint: hypertension 29 y.o. female  at 33w4d 
weeks gestation who is seen for an elevated BP while at [facility]. Pt notes good FM. She denies VB, LOF, uterine ctx, abdominal pain, CP, SOB, HA, or scotomata. HISTORY: 
 
Social History Substance and Sexual Activity Sexual Activity Yes  Partners: Male  Birth control/protection: None Patient's last menstrual period was 2018 (exact date). Social History Socioeconomic History  Marital status:  Spouse name: Gregg Lea  Number of children: 0  
 Years of education: masters  Highest education level: Master's degree (e.g., MA, MS, Amy, MEd, MSW, BERNADETTE) Social Needs  Financial resource strain: Not very hard  Food insecurity - worry: Never true  Food insecurity - inability: Never true  Transportation needs - medical: No  
 Transportation needs - non-medical: No  
Occupational History  Not on file Tobacco Use  Smoking status: Never Smoker  Smokeless tobacco: Never Used Substance and Sexual Activity  Alcohol use: Yes Comment: none with +UPT  Drug use: No  
 Sexual activity: Yes  
  Partners: Male Birth control/protection: None Other Topics Concern [address] Service Not Asked  Blood Transfusions Not Asked  Caffeine Concern Not Asked  Occupational Exposure Not Asked Phong Marcus Hazards Not Asked  Sleep Concern Not Asked  Stress Concern Not Asked  Weight Concern Not Asked  Special Diet Not Asked  Back Care Not Asked  Exercise Not Asked  Bike Helmet Not Asked  Seat Belt Not Asked  Self-Exams Not Asked Social History Narrative  Not on file Past Surgical History:  
Procedure Laterality Date  HX COLPOSCOPY  4/15/13  
 4/13 neg;3/12 CHRISTIE I endo and ecto;3/11 CHRISTIE I endo and ecto Past Medical History:  
Diagnosis Date  Abnormal Papanicolaou smear of cervix  Anemia  Dysmenorrhea   
 better on OCP  
  Hx of abnormal Pap smear 2/27/13 2/13 neg pap, +HPV neg 16 and18;2/2012 ASCUS pap,+HPV; 2/2011 neg pap, +HPV neg 16 and 18  Hypertension affecting pregnancy, third trimester 1/19/2019  Psychiatric problem  Traumatic injury during pregnancy, antepartum, third trimester 1/4/2019 ROS: 
An 8 point review of symptoms negative except for chief complaint as described above. PHYSICAL EXAM: 
Blood pressure 106/70, pulse 77, height 5' 2\" (1.575 m), weight 62.6 kg (138 lb), last menstrual period 05/29/2018. Constitutional: The patient appears well, alert, oriented x 3. Cardiovascular: Heart RRR, no murmurs. Respiratory: Lungs clear, no respiratory distress GI: Abdomen soft, nontender, no guarding No fundal tenderness Lower ext: no edema, neg ed's, reflexes +2 Psychiatric:Mood/ Affect: appropriate Genitourinary: SVE: long, closed, firm FHT: Category 1 with mod variability and + accels TOCO: rare ctx I personally reviewed pt's medical record including relevant labs and ultrasounds Assessment/Plan: No evidence of hypertension or PEC. Pt is discharged to home with PEC precautions. Pt to f/u with her PObP.

## 2019-01-19 NOTE — DISCHARGE INSTRUCTIONS
Patient Education        Pregnancy Precautions: Care Instructions  Your Care Instructions    There is no sure way to prevent labor before your due date ( labor) or to prevent most other pregnancy problems. But there are things you can do to increase your chances of a healthy pregnancy. Go to your appointments, follow your doctor's advice, and take good care of yourself. Eat well, and exercise (if your doctor agrees). And make sure to drink plenty of water. Follow-up care is a key part of your treatment and safety. Be sure to make and go to all appointments, and call your doctor if you are having problems. It's also a good idea to know your test results and keep a list of the medicines you take. How can you care for yourself at home? · Make sure you go to your prenatal appointments. At each visit, your doctor will check your blood pressure. Your doctor will also check to see if you have protein in your urine. High blood pressure and protein in urine are signs of preeclampsia. This condition can be dangerous for you and your baby. · Drink plenty of fluids, enough so that your urine is light yellow or clear like water. Dehydration can cause contractions. If you have kidney, heart, or liver disease and have to limit fluids, talk with your doctor before you increase the amount of fluids you drink. · Tell your doctor right away if you notice any symptoms of an infection, such as:  ? Burning when you urinate. ? A foul-smelling discharge from your vagina. ? Vaginal itching. ? Unexplained fever. ? Unusual pain or soreness in your uterus or lower belly. · Eat a balanced diet. Include plenty of foods that are high in calcium and iron. ? Foods high in calcium include milk, cheese, yogurt, almonds, and broccoli. ? Foods high in iron include red meat, shellfish, poultry, eggs, beans, raisins, whole-grain bread, and leafy green vegetables. · Do not smoke.  If you need help quitting, talk to your doctor about stop-smoking programs and medicines. These can increase your chances of quitting for good. · Do not drink alcohol or use illegal drugs. · Follow your doctor's directions about activity. Your doctor will let you know how much, if any, exercise you can do. · Ask your doctor if you can have sex. If you are at risk for early labor, your doctor may ask you to not have sex. · Take care to prevent falls. During pregnancy, your joints are loose, and your balance is off. Sports such as bicycling, skiing, or in-line skating can increase your risk of falling. And don't ride horses or motorcycles, dive, water ski, scuba dive, or parachute jump while you are pregnant. · Avoid getting very hot. Do not use saunas or hot tubs. Avoid staying out in the sun in hot weather for long periods. Take acetaminophen (Tylenol) to lower a high fever. · Do not take any over-the-counter or herbal medicines or supplements without talking to your doctor or pharmacist first.  When should you call for help? Call 911 anytime you think you may need emergency care. For example, call if:    · You passed out (lost consciousness).     · You have severe vaginal bleeding.     · You have severe pain in your belly or pelvis.     · You have had fluid gushing or leaking from your vagina and you know or think the umbilical cord is bulging into your vagina. If this happens, immediately get down on your knees so your rear end (buttocks) is higher than your head. This will decrease the pressure on the cord until help arrives.   [facility] your doctor now or seek immediate medical care if:    · You have signs of preeclampsia, such as:  ? Sudden swelling of your face, hands, or feet. ? New vision problems (such as dimness or blurring). ? A severe headache.     · You have any vaginal bleeding.     · You have belly pain or cramping.     · You have a fever.     · You have had regular contractions (with or without pain) for an hour.  This means that you have 8 or more within 1 hour or 4 or more in 20 minutes after you change your position and drink fluids.     · You have a sudden release of fluid from your vagina.     · You have low back pain or pelvic pressure that does not go away.     · You notice that your baby has stopped moving or is moving much less than normal.    Watch closely for changes in your health, and be sure to contact your doctor if you have any problems. Where can you learn more? Go to http://sha-jerardo.info/. Enter [id number] in the search box to learn more about \"Pregnancy Precautions: Care Instructions. \"  Current as of: September 5, 2018  Content Version: 11.9  © 4793-2625 [employer]. Care instructions adapted under license by [employer] (which disclaims liability or warranty for this information). If you have questions about a medical condition or this instruction, always ask your healthcare professional. [address] any warranty or liability for your use of this information.

## 2019-01-19 NOTE — PROGRESS NOTES
, 33.4 weeks, here with c/o elevated BP at [facility] care. Denies headache, epigatric pain, no edema or blurred vision.   Last ate 10 am.

## 2019-01-20 ENCOUNTER — HOSPITAL ENCOUNTER (OUTPATIENT)
Age: 29
Discharge: HOME | End: 2019-01-20
Attending: OBSTETRICS & GYNECOLOGY | Admitting: OBSTETRICS & GYNECOLOGY
Payer: COMMERCIAL

## 2019-01-20 VITALS
BODY MASS INDEX: 25.76 KG/M2 | SYSTOLIC BLOOD PRESSURE: 119 MMHG | HEART RATE: 66 BPM | HEIGHT: 62 IN | DIASTOLIC BLOOD PRESSURE: 79 MMHG | WEIGHT: 140 LBS

## 2019-01-20 PROCEDURE — 59025 FETAL NON-STRESS TEST: CPT

## 2019-01-20 PROCEDURE — 99283 EMERGENCY DEPT VISIT LOW MDM: CPT

## 2019-01-20 NOTE — ED PROVIDER NOTES
Chief Complaint: decreased FM 
 
 
29 y.o. female at 33w5d 
weeks gestation who is seen for several hours of decreased FM earlier. Pt now is getting good FM. She denies VB, LOF, UTI or PEC symptoms. HISTORY: 
 
Social History Substance and Sexual Activity Sexual Activity Yes  Partners: Male  Birth control/protection: None Patient's last menstrual period was 05/29/2018 (exact date). Social History Socioeconomic History  Marital status:  Spouse name: Edith Pruitt  Number of children: 0  
 Years of education: masters  Highest education level: Master's degree (e.g., MA, MS, Amy, MEd, MSW, BERNADETTE) Social Needs  Financial resource strain: Not very hard  Food insecurity - worry: Never true  Food insecurity - inability: Never true  Transportation needs - medical: No  
 Transportation needs - non-medical: No  
Occupational History  Not on file Tobacco Use  Smoking status: Never Smoker  Smokeless tobacco: Never Used Substance and Sexual Activity  Alcohol use: Yes Comment: none with +UPT  Drug use: No  
 Sexual activity: Yes  
  Partners: Male Birth control/protection: None Other Topics Concern [address] Service Not Asked  Blood Transfusions Not Asked  Caffeine Concern Not Asked  Occupational Exposure Not Asked Donnell  Hazards Not Asked  Sleep Concern Not Asked  Stress Concern Not Asked  Weight Concern Not Asked  Special Diet Not Asked  Back Care Not Asked  Exercise Not Asked  Bike Helmet Not Asked  Seat Belt Not Asked  Self-Exams Not Asked Social History Narrative  Not on file Past Surgical History:  
Procedure Laterality Date  HX COLPOSCOPY  4/15/13  
 4/13 neg;3/12 CHRISTIE I endo and ecto;3/11 CHRISTIE I endo and ecto Past Medical History:  
Diagnosis Date  Abnormal Papanicolaou smear of cervix  Anemia  Dysmenorrhea   
 better on OCP  
  Hx of abnormal Pap smear 2/27/13 2/13 neg pap, +HPV neg 16 and18;2/2012 ASCUS pap,+HPV; 2/2011 neg pap, +HPV neg 16 and 18  Hypertension affecting pregnancy, third trimester 1/19/2019  Psychiatric problem  Traumatic injury during pregnancy, antepartum, third trimester 1/4/2019 ROS: 
An 8 point review of symptoms negative except for chief complaint as described above. PHYSICAL EXAM: 
Height 5' 2\" (1.575 m), weight 63.5 kg (140 lb), last menstrual period 05/29/2018. Constitutional: The patient appears well, alert, oriented x 3. GI: Abdomen soft, nontender, no guarding No fundal tenderness Lower ext: no edema, neg ed's, reflexes +2 Psychiatric:Mood/ Affect: appropriate Genitourinary: SVE: not checked FHT: after prolonged monitoring, FHR is Category 1 with mod variability and + accels; reactive TOCO: no ctx I personally reviewed pt's medical record including relevant labs and ultrasounds Assessment/Plan: 
Fetal wellbeing demonstrated. Pt discharged to home with labor precautions. Pt to f/u with her PObP.

## 2019-01-20 NOTE — PROGRESS NOTES
Patient discharged to home in stable condition with fetal kick counts. Patient and spouse verbalized understanding of these.

## 2019-01-20 NOTE — DISCHARGE INSTRUCTIONS
Patient Education   Patient Education        Weeks 32 to 29 of Your Pregnancy: Care Instructions  Your Care Instructions    During the last few weeks of your pregnancy, you may have more aches and pains. It's important to rest when you can. Your growing baby is putting more pressure on your bladder. So you may need to urinate more often. Hemorrhoids are also common. These are painful, itchy veins in the rectal area. In the 36th week, most women have a test for group B streptococcus (GBS). GBS is a common bacteria that can live in the vagina and rectum. It can make your baby sick after birth. If you test positive, you will get antibiotics during labor. These will keep your baby from getting the bacteria. You may want to talk with your doctor about banking your baby's umbilical cord blood. This is the blood left in the cord after birth. If you want to save this blood, you must arrange it ahead of time. You can't decide at the last minute. If you haven't already had the Tdap shot during this pregnancy, talk to your doctor about getting it. It will help protect your  against pertussis infection. Follow-up care is a key part of your treatment and safety. Be sure to make and go to all appointments, and call your doctor if you are having problems. It's also a good idea to know your test results and keep a list of the medicines you take. How can you care for yourself at home? Ease hemorrhoids  · Get more liquids, fruits, vegetables, and fiber in your diet. This will help keep your stools soft. · Avoid sitting for too long. Lie on your left side several times a day. · Clean yourself with soft, moist toilet paper. Or you can use witch hazel pads or personal hygiene pads. · If you are uncomfortable, try ice packs. Or you can sit in a warm sitz bath. Do these for 20 minutes at a time, as needed. · Use hydrocortisone cream for pain and itching. Two examples are Anusol and Preparation H Hydrocortisone.   · Ask your doctor about taking an over-the-counter stool softener. Consider breastfeeding  · Experts recommend that women breastfeed for 1 year or longer. Breast milk is the perfect food for babies. · Breast milk is easier for babies to digest than formula. And it is always available, just the right temperature, and free. · Breast milk may help protect your child from some health problems.  babies are less likely than formula-fed babies to:  ? Get ear infections, colds, diarrhea, and pneumonia. ? Be obese or get diabetes later in life. · Women who breastfeed have less bleeding after the birth. Their uteruses also shrink back faster. · Some women who breastfeed lose weight faster. Making milk burns calories. · Breastfeeding can lower your risk of breast cancer, ovarian cancer, and osteoporosis. Decide about circumcision for boys  · As you make this decision, it may help to think about your personal, Scientologist, and family traditions. You get to decide if you will keep your son's penis natural or if he will be circumcised. · If you decide that you would like to have your baby circumcised, talk with your doctor. You can share your concerns about pain. And you can discuss your preferences for anesthesia. Where can you learn more? Go to http://sha-jerardo.info/. Enter L189 in the search box to learn more about \"Weeks 32 to 34 of Your Pregnancy: Care Instructions. \"  Current as of: September 5, 2018  Content Version: 11.9  © 4998-7357 Healthwise, Incorporated. Care instructions adapted under license by [employer] (which disclaims liability or warranty for this information). If you have questions about a medical condition or this instruction, always ask your healthcare professional. [place] [address] any warranty or liability for your use of this information.             Counting Your Baby's Kicks: Care Instructions  Your Care Instructions    Counting your baby's kicks is one way your doctor can tell that your baby is healthy. Most women--especially in a first pregnancy--feel their baby move for the first time between 16 and 22 weeks. The movement may feel like flutters rather than kicks. Your baby may move more at certain times of the day. When you are active, you may notice less kicking than when you are resting. At your prenatal visits, your doctor will ask whether the baby is active. In your last trimester, your doctor may ask you to count the number of times you feel your baby move. Follow-up care is a key part of your treatment and safety. Be sure to make and go to all appointments, and call your doctor if you are having problems. It's also a good idea to know your test results and keep a list of the medicines you take. How do you count fetal kicks? · A common method of checking your baby's movement is to count the number of kicks or moves you feel in 1 hour. Ten movements (such as kicks, flutters, or rolls) in 1 hour are normal. Some doctors suggest that you count in the morning until you get to 10 movements. Then you can quit for that day and start again the next day. · Pick your baby's most active time of day to count. This may be any time from morning to evening. · If you do not feel 10 movements in an hour, your baby may be sleeping. Wait for the next hour and count again. When should you call for help? Call your doctor now or seek immediate medical care if:    · You noticed that your baby has stopped moving or is moving much less than normal.    Watch closely for changes in your health, and be sure to contact your doctor if you have any problems. Where can you learn more? Go to http://sha-jreardo.info/. Enter K753 in the search box to learn more about \"Counting Your Baby's Kicks: Care Instructions. \"  Current as of: September 5, 2018  Content Version: 11.9  © 7537-7487 [employer].  Care instructions adapted under license by [address] (which disclaims liability or warranty for this information). If you have questions about a medical condition or this instruction, always ask your healthcare professional. [address] any warranty or liability for your use of this information.

## 2019-01-22 ENCOUNTER — HOSPITAL ENCOUNTER (OUTPATIENT)
Age: 29
Discharge: HOME | End: 2019-01-22
Attending: OBSTETRICS & GYNECOLOGY | Admitting: OBSTETRICS & GYNECOLOGY
Payer: COMMERCIAL

## 2019-01-22 PROCEDURE — 99283 EMERGENCY DEPT VISIT LOW MDM: CPT

## 2019-01-22 PROCEDURE — 59025 FETAL NON-STRESS TEST: CPT

## 2019-01-23 VITALS
DIASTOLIC BLOOD PRESSURE: 74 MMHG | TEMPERATURE: 98.4 F | SYSTOLIC BLOOD PRESSURE: 114 MMHG | HEART RATE: 78 BPM | RESPIRATION RATE: 18 BRPM

## 2019-01-23 NOTE — PROGRESS NOTES
Pt arrived from home for an increase in her blood pressure. At home it was 130's/90's. Pt denies any change in her contractions and no pain with them. No leaking of fluid or bleeding. Baby moving normal.  Spoke with her about taking accurate BP's. Monitors applied. Pt said that no changes in her normal triage questions since this weekend.

## 2019-01-23 NOTE — PROGRESS NOTES
Dr. Arely Thomas reviewed the fetal strip and confirmed that patient may be discharged home to follow up in the office in the next 1-2 days. Pt removed from the monitor to get dressed.

## 2019-01-23 NOTE — DISCHARGE INSTRUCTIONS
Patient Education   Patient Education        Follow up with your doctor's office in the next 1-2 days. Return to the hospital with any needs or concerns, any signs of active labor, signs your water has broken, a decrease in baby movement or bright red vaginal bleeding. Weeks 34 to 36 of Your Pregnancy: Care Instructions  Your Care Instructions    By now, your baby and your belly have grown quite large. It is almost time to give birth. A full-term pregnancy can deliver between 37 and 42 weeks. Your baby's lungs are almost ready to breathe air. The bones in your baby's head are now firm enough to protect it, but soft enough to move down through the birth canal.  You may feel excited, happy, anxious, or scared. You may wonder how you will know if you are in labor or what to expect during labor. Try to be flexible in your expectations of the birth. Because each birth is different, there is no way to know exactly what childbirth will be like for you. This care sheet will help you know what to expect and how to prepare. This may make your childbirth easier. If you haven't already had the Tdap shot during this pregnancy, talk to your doctor about getting it. It will help protect your  against pertussis infection. In the 36th week, most women have a test for group B streptococcus (GBS). GBS is a common bacteria that can live in the vagina and rectum. It can make your baby sick after birth. If you test positive, you will get antibiotics during labor. The medicine will keep your baby from getting the bacteria. Follow-up care is a key part of your treatment and safety. Be sure to make and go to all appointments, and call your doctor if you are having problems. It's also a good idea to know your test results and keep a list of the medicines you take. How can you care for yourself at home? Learn about pain relief choices  · Pain is different for every woman.  Talk with your doctor about your feelings about pain.  · You can choose from several types of pain relief. These include medicine or breathing techniques, as well as comfort measures. You can use more than one option. · If you choose to have pain medicine during labor, talk to your doctor about your options. Some medicines lower anxiety and help with some of the pain. Others make your lower body numb so that you won't feel pain. · Be sure to tell your doctor about your pain medicine choice before you start labor or very early in your labor. You may be able to change your mind as labor progresses. · Rarely, a woman is put to sleep by medicine given through a mask or an IV. Labor and delivery  · The first stage of labor has three parts: early, active, and transition. ? Most women have early labor at home. You can stay busy or rest, eat light snacks, drink clear fluids, and start counting contractions. ? When talking during a contraction gets hard, you may be moving to active labor. During active labor, you should head for the hospital if you are not there already. ? You are in active labor when contractions come every 3 to 4 minutes and last about 60 seconds. Your cervix is opening more rapidly. ? If your water breaks, contractions will come faster and stronger. ? During transition, your cervix is stretching, and contractions are coming more rapidly. ? You may want to push, but your cervix might not be ready. Your doctor will tell you when to push. · The second stage starts when your cervix is completely opened and you are ready to push. ? Contractions are very strong to push the baby down the birth canal.  ? You will feel the urge to push. You may feel like you need to have a bowel movement. ? You may be coached to push with contractions. These contractions will be very strong, but you will not have them as often. You can get a little rest between contractions. ? You may be emotional and irritable.  You may not be aware of what is going on around you.  ? One last push, and your baby is born. · The third stage is when a few more contractions push out the placenta. This may take 30 minutes or less. · The fourth stage is the welcome recovery. You may feel overwhelmed with emotions and exhausted but alert. This is a good time to start breastfeeding. Where can you learn more? Go to http://sha-jerardo.info/. Enter W424 in the search box to learn more about \"Weeks 34 to 36 of Your Pregnancy: Care Instructions. \"  Current as of: 2018  Content Version: 11.9  © 7686-0016 [employer]. Care instructions adapted under license by [employer] (which disclaims liability or warranty for this information). If you have questions about a medical condition or this instruction, always ask your healthcare professional. [address] any warranty or liability for your use of this information. Pregnancy Precautions: Care Instructions  Your Care Instructions    There is no sure way to prevent labor before your due date ( labor) or to prevent most other pregnancy problems. But there are things you can do to increase your chances of a healthy pregnancy. Go to your appointments, follow your doctor's advice, and take good care of yourself. Eat well, and exercise (if your doctor agrees). And make sure to drink plenty of water. Follow-up care is a key part of your treatment and safety. Be sure to make and go to all appointments, and call your doctor if you are having problems. It's also a good idea to know your test results and keep a list of the medicines you take. How can you care for yourself at home? · Make sure you go to your prenatal appointments. At each visit, your doctor will check your blood pressure. Your doctor will also check to see if you have protein in your urine. High blood pressure and protein in urine are signs of preeclampsia.  This condition can be dangerous for you and your baby. · Drink plenty of fluids, enough so that your urine is light yellow or clear like water. Dehydration can cause contractions. If you have kidney, heart, or liver disease and have to limit fluids, talk with your doctor before you increase the amount of fluids you drink. · Tell your doctor right away if you notice any symptoms of an infection, such as:  ? Burning when you urinate. ? A foul-smelling discharge from your vagina. ? Vaginal itching. ? Unexplained fever. ? Unusual pain or soreness in your uterus or lower belly. · Eat a balanced diet. Include plenty of foods that are high in calcium and iron. ? Foods high in calcium include milk, cheese, yogurt, almonds, and broccoli. ? Foods high in iron include red meat, shellfish, poultry, eggs, beans, raisins, whole-grain bread, and leafy green vegetables. · Do not smoke. If you need help quitting, talk to your doctor about stop-smoking programs and medicines. These can increase your chances of quitting for good. · Do not drink alcohol or use illegal drugs. · Follow your doctor's directions about activity. Your doctor will let you know how much, if any, exercise you can do. · Ask your doctor if you can have sex. If you are at risk for early labor, your doctor may ask you to not have sex. · Take care to prevent falls. During pregnancy, your joints are loose, and your balance is off. Sports such as bicycling, skiing, or in-line skating can increase your risk of falling. And don't ride horses or motorcycles, dive, water ski, scuba dive, or parachute jump while you are pregnant. · Avoid getting very hot. Do not use saunas or hot tubs. Avoid staying out in the sun in hot weather for long periods. Take acetaminophen (Tylenol) to lower a high fever. · Do not take any over-the-counter or herbal medicines or supplements without talking to your doctor or pharmacist first.  When should you call for help?   Call 911 anytime you think you may need emergency care. For example, call if:    · You passed out (lost consciousness).     · You have severe vaginal bleeding.     · You have severe pain in your belly or pelvis.     · You have had fluid gushing or leaking from your vagina and you know or think the umbilical cord is bulging into your vagina. If this happens, immediately get down on your knees so your rear end (buttocks) is higher than your head. This will decrease the pressure on the cord until help arrives.   [facility] your doctor now or seek immediate medical care if:    · You have signs of preeclampsia, such as:  ? Sudden swelling of your face, hands, or feet. ? New vision problems (such as dimness or blurring). ? A severe headache.     · You have any vaginal bleeding.     · You have belly pain or cramping.     · You have a fever.     · You have had regular contractions (with or without pain) for an hour. This means that you have 8 or more within 1 hour or 4 or more in 20 minutes after you change your position and drink fluids.     · You have a sudden release of fluid from your vagina.     · You have low back pain or pelvic pressure that does not go away.     · You notice that your baby has stopped moving or is moving much less than normal.    Watch closely for changes in your health, and be sure to contact your doctor if you have any problems. Where can you learn more? Go to http://sha-jerardo.info/. Enter [id number] in the search box to learn more about \"Pregnancy Precautions: Care Instructions. \"  Current as of: September 5, 2018  Content Version: 11.9  © 4552-3170 Healthwise, Incorporated. Care instructions adapted under license by [employer] (which disclaims liability or warranty for this information). If you have questions about a medical condition or this instruction, always ask your healthcare professional. [address] any warranty or liability for your use of this information.

## 2019-01-23 NOTE — PROGRESS NOTES
FHR had a deceleration while Dr. Pauly Sheth talking with the patient. We will monitor the fetal heart tones for a couple hours to ensure fetal well being.

## 2019-01-23 NOTE — PROGRESS NOTES
Pt verbalized understanding of her discharge instructions. She denies any questions or concerns. Pt encouraged to call her doctor for a follow up appointment in the Am and to return with any concerns or signs of active labor.

## 2019-01-23 NOTE — H&P
Chief Complaint: elevated BP at home 
 
 
29 y.o. female at 34w0d 
weeks gestation who is seen for elevated BP at home. Pt notes good FM. She denies uterine ctx, abdominal pain, CP, SOB, HA, or scotomata. Pt had PEC labs checked in the office yesterday. They were normal. 
 
 
 
HISTORY: 
 
Social History Substance and Sexual Activity Sexual Activity Yes  Partners: Male  Birth control/protection: None Patient's last menstrual period was 05/29/2018 (exact date). Social History Socioeconomic History  Marital status:  Spouse name: Yeison Lala  Number of children: 0  
 Years of education: masters  Highest education level: Master's degree (e.g., MA, MS, Amy, MEd, MSW, [employer]NA[employer]) Social Needs  Financial resource strain: Not very hard  Food insecurity - worry: Never true  Food insecurity - inability: Never true  Transportation needs - medical: No  
 Transportation needs - non-medical: No  
Occupational History  Not on file Tobacco Use  Smoking status: Never Smoker  Smokeless tobacco: Never Used Substance and Sexual Activity  Alcohol use: Yes Comment: none with +UPT  Drug use: No  
 Sexual activity: Yes  
  Partners: Male Birth control/protection: None Other Topics Concern [address] Service Not Asked  Blood Transfusions Not Asked  Caffeine Concern Not Asked  Occupational Exposure Not Asked Rosalina Spears Hazards Not Asked  Sleep Concern Not Asked  Stress Concern Not Asked  Weight Concern Not Asked  Special Diet Not Asked  Back Care Not Asked  Exercise Not Asked  Bike Helmet Not Asked  Seat Belt Not Asked  Self-Exams Not Asked Social History Narrative  Not on file Past Surgical History:  
Procedure Laterality Date  HX COLPOSCOPY  4/15/13  
 4/13 neg;3/12 CHRISTIE I endo and ecto;3/11 CHRISTIE I endo and ecto Past Medical History:  
Diagnosis Date  Abnormal Papanicolaou smear of cervix  Anemia  Dysmenorrhea   
 better on OCP  
 Hx of abnormal Pap smear 2/27/13 2/13 neg pap, +HPV neg 16 and18;2/2012 ASCUS pap,+HPV; 2/2011 neg pap, +HPV neg 16 and 18  Hypertension affecting pregnancy, third trimester 1/19/2019  Psychiatric problem  Traumatic injury during pregnancy, antepartum, third trimester 1/4/2019 ROS: 
An 8 point review of symptoms negative except for chief complaint as described above. PHYSICAL EXAM: 
Blood pressure 119/77, pulse 86, temperature 98.4 °F (36.9 °C), resp. rate 18, last menstrual period 05/29/2018. Constitutional: The patient appears well, alert, oriented x 3. GI: Abdomen soft, nontender, no guarding No fundal tenderness Lower ext: no edema, neg ed's, reflexes +2 Psychiatric:Mood/ Affect: appropriate Genitourinary: SVE: not checked FHT: 145/minutes with mod variability and + accels; isolated late decel TOCO: Irregular ctx I personally reviewed pt's medical record including relevant labs and ultrasounds Assessment/Plan: No evidence of hypertension or PEC. Management d/w Dr. Wendell Cockayne, pt's PObP. Will monitor for the next 2 hours. If FHR tracing is Category 1 and reactive, will discharge to home with PEC precautions. Pt will f/u within 48 hours with her PObP.

## 2019-01-23 NOTE — PROGRESS NOTES
Checked in with the patient. She denies any needs, repositioned for patient's comfort. Questions answered.

## 2019-02-04 LAB — GRBS, EXTERNAL: NEGATIVE

## 2019-02-10 ENCOUNTER — HOSPITAL ENCOUNTER (OUTPATIENT)
Age: 29
Discharge: HOME | End: 2019-02-10
Attending: OBSTETRICS & GYNECOLOGY | Admitting: OBSTETRICS & GYNECOLOGY
Payer: COMMERCIAL

## 2019-02-10 VITALS
DIASTOLIC BLOOD PRESSURE: 92 MMHG | WEIGHT: 144 LBS | SYSTOLIC BLOOD PRESSURE: 131 MMHG | RESPIRATION RATE: 18 BRPM | TEMPERATURE: 98 F | HEART RATE: 75 BPM | BODY MASS INDEX: 26.5 KG/M2 | HEIGHT: 62 IN

## 2019-02-10 LAB
GLUCOSE, GLUUPC: NEGATIVE
KETONES UR-MCNC: NEGATIVE MG/DL
PROT UR QL: NEGATIVE

## 2019-02-10 PROCEDURE — 81002 URINALYSIS NONAUTO W/O SCOPE: CPT | Performed by: OBSTETRICS & GYNECOLOGY

## 2019-02-10 PROCEDURE — 76815 OB US LIMITED FETUS(S): CPT

## 2019-02-10 PROCEDURE — 99283 EMERGENCY DEPT VISIT LOW MDM: CPT

## 2019-02-10 NOTE — DISCHARGE INSTRUCTIONS
Patient Education        Pregnancy Precautions: Care Instructions  Your Care Instructions    There is no sure way to prevent labor before your due date ( labor) or to prevent most other pregnancy problems. But there are things you can do to increase your chances of a healthy pregnancy. Go to your appointments, follow your doctor's advice, and take good care of yourself. Eat well, and exercise (if your doctor agrees). And make sure to drink plenty of water. Follow-up care is a key part of your treatment and safety. Be sure to make and go to all appointments, and call your doctor if you are having problems. It's also a good idea to know your test results and keep a list of the medicines you take. How can you care for yourself at home? · Make sure you go to your prenatal appointments. At each visit, your doctor will check your blood pressure. Your doctor will also check to see if you have protein in your urine. High blood pressure and protein in urine are signs of preeclampsia. This condition can be dangerous for you and your baby. · Drink plenty of fluids, enough so that your urine is light yellow or clear like water. Dehydration can cause contractions. If you have kidney, heart, or liver disease and have to limit fluids, talk with your doctor before you increase the amount of fluids you drink. · Tell your doctor right away if you notice any symptoms of an infection, such as:  ? Burning when you urinate. ? A foul-smelling discharge from your vagina. ? Vaginal itching. ? Unexplained fever. ? Unusual pain or soreness in your uterus or lower belly. · Eat a balanced diet. Include plenty of foods that are high in calcium and iron. ? Foods high in calcium include milk, cheese, yogurt, almonds, and broccoli. ? Foods high in iron include red meat, shellfish, poultry, eggs, beans, raisins, whole-grain bread, and leafy green vegetables. · Do not smoke.  If you need help quitting, talk to your doctor about stop-smoking programs and medicines. These can increase your chances of quitting for good. · Do not drink alcohol or use illegal drugs. · Follow your doctor's directions about activity. Your doctor will let you know how much, if any, exercise you can do. · Ask your doctor if you can have sex. If you are at risk for early labor, your doctor may ask you to not have sex. · Take care to prevent falls. During pregnancy, your joints are loose, and your balance is off. Sports such as bicycling, skiing, or in-line skating can increase your risk of falling. And don't ride horses or motorcycles, dive, water ski, scuba dive, or parachute jump while you are pregnant. · Avoid getting very hot. Do not use saunas or hot tubs. Avoid staying out in the sun in hot weather for long periods. Take acetaminophen (Tylenol) to lower a high fever. · Do not take any over-the-counter or herbal medicines or supplements without talking to your doctor or pharmacist first.  When should you call for help? Call 911 anytime you think you may need emergency care. For example, call if:    · You passed out (lost consciousness).     · You have severe vaginal bleeding.     · You have severe pain in your belly or pelvis.     · You have had fluid gushing or leaking from your vagina and you know or think the umbilical cord is bulging into your vagina. If this happens, immediately get down on your knees so your rear end (buttocks) is higher than your head. This will decrease the pressure on the cord until help arrives.   [facility] your doctor now or seek immediate medical care if:    · You have signs of preeclampsia, such as:  ? Sudden swelling of your face, hands, or feet. ? New vision problems (such as dimness or blurring). ? A severe headache.     · You have any vaginal bleeding.     · You have belly pain or cramping.     · You have a fever.     · You have had regular contractions (with or without pain) for an hour.  This means that you have 8 or more within 1 hour or 4 or more in 20 minutes after you change your position and drink fluids.     · You have a sudden release of fluid from your vagina.     · You have low back pain or pelvic pressure that does not go away.     · You notice that your baby has stopped moving or is moving much less than normal.    Watch closely for changes in your health, and be sure to contact your doctor if you have any problems. Where can you learn more? Go to http://sha-jerardo.info/. Enter [id number] in the search box to learn more about \"Pregnancy Precautions: Care Instructions. \"  Current as of: September 5, 2018  Content Version: 11.9  © 1308-9939 [employer]. Care instructions adapted under license by [employer] (which disclaims liability or warranty for this information). If you have questions about a medical condition or this instruction, always ask your healthcare professional. [place] [address] any warranty or liability for your use of this information. DISCHARGE SUMMARY from Nurse    PATIENT INSTRUCTIONS:    After general anesthesia or intravenous sedation, for 24 hours or while taking prescription Narcotics:  · Limit your activities  · Do not drive and operate hazardous machinery  · Do not make important personal or business decisions  · Do  not drink alcoholic beverages  · If you have not urinated within 8 hours after discharge, please contact your surgeon on call.     Report the following to your surgeon:  · Excessive pain, swelling, redness or odor of or around the surgical area  · Temperature over 100.5  · Nausea and vomiting lasting longer than 4 hours or if unable to take medications  · Any signs of decreased circulation or nerve impairment to extremity: change in color, persistent  numbness, tingling, coldness or increase pain  · Any questions    What to do at Home:  Recommended activity: Activity as tolerated,     If you experience any of the following symptoms bleeding, water breaks, contractions, pain, bleeding, please follow up with MD.    *  Please give a list of your current medications to your Primary Care Provider. *  Please update this list whenever your medications are discontinued, doses are      changed, or new medications (including over-the-counter products) are added. *  Please carry medication information at all times in case of emergency situations. These are general instructions for a healthy lifestyle:    No smoking/ No tobacco products/ Avoid exposure to second hand smoke  Surgeon General's Warning:  Quitting smoking now greatly reduces serious risk to your health. Obesity, smoking, and sedentary lifestyle greatly increases your risk for illness    A healthy diet, regular physical exercise & weight monitoring are important for maintaining a healthy lifestyle    You may be retaining fluid if you have a history of heart failure or if you experience any of the following symptoms:  Weight gain of 3 pounds or more overnight or 5 pounds in a week, increased swelling in our hands or feet or shortness of breath while lying flat in bed. Please call your doctor as soon as you notice any of these symptoms; do not wait until your next office visit. Recognize signs and symptoms of STROKE:    F-face looks uneven    A-arms unable to move or move unevenly    S-speech slurred or non-existent    T-time-call 911 as soon as signs and symptoms begin-DO NOT go       Back to bed or wait to see if you get better-TIME IS BRAIN. Warning Signs of HEART ATTACK     Call 911 if you have these symptoms:   Chest discomfort. Most heart attacks involve discomfort in the center of the chest that lasts more than a few minutes, or that goes away and comes back. It can feel like uncomfortable pressure, squeezing, fullness, or pain.  Discomfort in other areas of the upper body.  Symptoms can include pain or discomfort in one or both arms, the back, neck, jaw, or stomach.  Shortness of breath with or without chest discomfort.  Other signs may include breaking out in a cold sweat, nausea, or lightheadedness. Don't wait more than five minutes to call 911 - MINUTES MATTER! Fast action can save your life. Calling 911 is almost always the fastest way to get lifesaving treatment. Emergency Medical Services staff can begin treatment when they arrive -- up to an hour sooner than if someone gets to the hospital by car. The discharge information has been reviewed with the patient. The patient verbalized understanding. Discharge medications reviewed with the patient and appropriate educational materials and side effects teaching were provided.   ___________________________________________________________________________________________________________________________________

## 2019-02-10 NOTE — H&P
Chief Complaint: Decreased fetal movement 
 
 
29 y.o. female  at 36w5d 
weeks gestation who is seen for several hours of decreased FM. Pt denies VB, LOF, uterine ctx, abdominal pain, CP, SOB, HA, scotomata, or UTI symptoms. Melvi Edwards HISTORY: 
 
Social History Substance and Sexual Activity Sexual Activity Yes  Partners: Male  Birth control/protection: None Patient's last menstrual period was 2018 (exact date). Social History Socioeconomic History  Marital status:  Spouse name: Mercedes Negro  Number of children: 0  
 Years of education: masters  Highest education level: Master's degree (e.g., MA, MS, Amy, MEd, MSW, [employer]ERNADETTE) Social Needs  Financial resource strain: Not very hard  Food insecurity - worry: Never true  Food insecurity - inability: Never true  Transportation needs - medical: No  
 Transportation needs - non-medical: No  
Occupational History  Not on file Tobacco Use  Smoking status: Never Smoker  Smokeless tobacco: Never Used Substance and Sexual Activity  Alcohol use: Yes Comment: none with +UPT  Drug use: No  
 Sexual activity: Yes  
  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  
Procedure Laterality Date  HX COLPOSCOPY  4/15/13  
 4/13 neg;3/12 CHRISTIE I endo and ecto;3/11 CHRISTIE I endo and ecto Past Medical History:  
Diagnosis Date  Abnormal Papanicolaou smear of cervix  Anemia  Dysmenorrhea   
 better on OCP  
  Hx of abnormal Pap smear 2/27/13 2/13 neg pap, +HPV neg 16 and18;2/2012 ASCUS pap,+HPV; 2/2011 neg pap, +HPV neg 16 and 18  Hypertension affecting pregnancy, third trimester 1/19/2019  Psychiatric problem  Traumatic injury during pregnancy, antepartum, third trimester 1/4/2019 ROS: 
An 8  point review of symptoms negative except for chief complaint as described above. PHYSICAL EXAM: 
Blood pressure (!) 135/91, pulse 76, temperature 98 °F (36.7 °C), resp. rate 18, height 5' 2\" (1.575 m), weight 65.3 kg (144 lb), last menstrual period 05/29/2018. Constitutional: The patient appears well, alert, oriented x 3. GI: Abdomen soft, nontender, no guarding No fundal tenderness Lower ext: no edema, neg ed's, reflexes +2 Psychiatric:Mood/ Affect: appropriate Genitourinary: SVE: not checked FHT: Category 1 with mod variability and + accels TOCO: no ctx Abd ultrasound: vtx; AFV is normal; active fetus I personally reviewed pt's medical record including relevant labs and ultrasounds Assessment/Plan: 
Continue monitoring FHR tracing until it is clearly reactive. Serial BP checks. Once BPs are normal and the FHR tracing is reactive, will discharge pt to home with labor precautions. Pt to f/u with her PObP.

## 2019-02-18 ENCOUNTER — ANESTHESIA EVENT (OUTPATIENT)
Dept: LABOR AND DELIVERY | Age: 29
End: 2019-02-18
Payer: COMMERCIAL

## 2019-02-18 ENCOUNTER — ANESTHESIA (OUTPATIENT)
Dept: LABOR AND DELIVERY | Age: 29
End: 2019-02-18
Payer: COMMERCIAL

## 2019-02-18 ENCOUNTER — HOSPITAL ENCOUNTER (INPATIENT)
Age: 29
LOS: 4 days | Discharge: HOME | End: 2019-02-22
Attending: OBSTETRICS & GYNECOLOGY | Admitting: OBSTETRICS & GYNECOLOGY
Payer: COMMERCIAL

## 2019-02-18 DIAGNOSIS — F41.1 GAD (GENERALIZED ANXIETY DISORDER): ICD-10-CM

## 2019-02-18 PROBLEM — O13.3 GESTATIONAL HTN, THIRD TRIMESTER: Status: ACTIVE | Noted: 2019-02-18

## 2019-02-18 PROBLEM — Z3A.37 37 WEEKS GESTATION OF PREGNANCY: Status: ACTIVE | Noted: 2019-02-18

## 2019-02-18 PROBLEM — O42.92 FULL-TERM PREMATURE RUPTURE OF MEMBRANES: Status: ACTIVE | Noted: 2019-02-18

## 2019-02-18 PROBLEM — Z34.90 ENCOUNTER FOR PLANNED INDUCTION OF LABOR: Status: ACTIVE | Noted: 2019-02-18

## 2019-02-18 LAB
ABO + RH BLD: NORMAL
ARTERIAL PATENCY WRIST A: ABNORMAL
BASE DEFICIT BLD-SCNC: 5 MMOL/L
BDY SITE: ABNORMAL
BLOOD GROUP ANTIBODIES SERPL: NORMAL
BODY TEMPERATURE: 98.6
CO2 BLD-SCNC: 26 MMOL/L
COLLECT TIME,HTIME: 2228
ERYTHROCYTE [DISTWIDTH] IN BLOOD BY AUTOMATED COUNT: 13 % (ref 11.9–14.6)
GAS FLOW.O2 O2 DELIVERY SYS: ABNORMAL L/MIN
HCO3 BLD-SCNC: 24.5 MMOL/L (ref 22–26)
HCT VFR BLD AUTO: 45.2 % (ref 35.8–46.3)
HGB BLD-MCNC: 15.5 G/DL (ref 11.7–15.4)
MCH RBC QN AUTO: 30.3 PG (ref 26.1–32.9)
MCHC RBC AUTO-ENTMCNC: 34.3 G/DL (ref 31.4–35)
MCV RBC AUTO: 88.3 FL (ref 79.6–97.8)
NRBC # BLD: 0 K/UL (ref 0–0.2)
PCO2 BLDCO: 59 MMHG (ref 32–68)
PH BLDCO: 7.23 [PH] (ref 7.15–7.38)
PLATELET # BLD AUTO: 232 K/UL (ref 150–450)
PMV BLD AUTO: 12 FL (ref 9.4–12.3)
PO2 BLDCO: 11 MMHG
RBC # BLD AUTO: 5.12 M/UL (ref 4.05–5.2)
SAO2 % BLD: 8 % (ref 95–98)
SERVICE CMNT-IMP: ABNORMAL
SPECIMEN EXP DATE BLD: NORMAL
SPECIMEN TYPE: ABNORMAL
WBC # BLD AUTO: 13.4 K/UL (ref 4.3–11.1)

## 2019-02-18 PROCEDURE — 74011000250 HC RX REV CODE- 250: Performed by: ANESTHESIOLOGY

## 2019-02-18 PROCEDURE — 77030018846 HC SOL IRR STRL H20 ICUM -A: Performed by: OBSTETRICS & GYNECOLOGY

## 2019-02-18 PROCEDURE — 76060000078 HC EPIDURAL ANESTHESIA: Performed by: OBSTETRICS & GYNECOLOGY

## 2019-02-18 PROCEDURE — 77030002966 HC SUT PDS J&J -A: Performed by: OBSTETRICS & GYNECOLOGY

## 2019-02-18 PROCEDURE — 77030032490 HC SLV COMPR SCD KNE COVD -B: Performed by: OBSTETRICS & GYNECOLOGY

## 2019-02-18 PROCEDURE — 65270000029 HC RM PRIVATE

## 2019-02-18 PROCEDURE — 74011000250 HC RX REV CODE- 250: Performed by: OBSTETRICS & GYNECOLOGY

## 2019-02-18 PROCEDURE — 76010000392 HC C SECN EA ADDL 0.5 HR: Performed by: OBSTETRICS & GYNECOLOGY

## 2019-02-18 PROCEDURE — 74011250636 HC RX REV CODE- 250/636

## 2019-02-18 PROCEDURE — 74011000250 HC RX REV CODE- 250

## 2019-02-18 PROCEDURE — 75410000003 HC RECOV DEL/VAG/CSECN EA 0.5 HR: Performed by: OBSTETRICS & GYNECOLOGY

## 2019-02-18 PROCEDURE — 74011250636 HC RX REV CODE- 250/636: Performed by: ANESTHESIOLOGY

## 2019-02-18 PROCEDURE — 77030002974 HC SUT PLN J&J -A: Performed by: OBSTETRICS & GYNECOLOGY

## 2019-02-18 PROCEDURE — 77030014125 HC TY EPDRL BBMI -B: Performed by: ANESTHESIOLOGY

## 2019-02-18 PROCEDURE — A4300 CATH IMPL VASC ACCESS PORTAL: HCPCS | Performed by: ANESTHESIOLOGY

## 2019-02-18 PROCEDURE — 77030034696 HC CATH URETH FOL 2W BARD -A: Performed by: OBSTETRICS & GYNECOLOGY

## 2019-02-18 PROCEDURE — 4A1HXCZ MONITORING OF PRODUCTS OF CONCEPTION, CARDIAC RATE, EXTERNAL APPROACH: ICD-10-PCS | Performed by: OBSTETRICS & GYNECOLOGY

## 2019-02-18 PROCEDURE — 77030031139 HC SUT VCRL2 J&J -A: Performed by: OBSTETRICS & GYNECOLOGY

## 2019-02-18 PROCEDURE — 86900 BLOOD TYPING SEROLOGIC ABO: CPT

## 2019-02-18 PROCEDURE — 77030011943

## 2019-02-18 PROCEDURE — 76010000391 HC C SECN FIRST 1 HR: Performed by: OBSTETRICS & GYNECOLOGY

## 2019-02-18 PROCEDURE — 77030018836 HC SOL IRR NACL ICUM -A: Performed by: OBSTETRICS & GYNECOLOGY

## 2019-02-18 PROCEDURE — 74011250637 HC RX REV CODE- 250/637: Performed by: ANESTHESIOLOGY

## 2019-02-18 PROCEDURE — 82803 BLOOD GASES ANY COMBINATION: CPT

## 2019-02-18 PROCEDURE — 85027 COMPLETE CBC AUTOMATED: CPT

## 2019-02-18 PROCEDURE — 77030002933 HC SUT MCRYL J&J -A: Performed by: OBSTETRICS & GYNECOLOGY

## 2019-02-18 PROCEDURE — 74011250636 HC RX REV CODE- 250/636: Performed by: OBSTETRICS & GYNECOLOGY

## 2019-02-18 RX ORDER — TRISODIUM CITRATE DIHYDRATE AND CITRIC ACID MONOHYDRATE 500; 334 MG/5ML; MG/5ML
30 SOLUTION ORAL ONCE
Status: COMPLETED | OUTPATIENT
Start: 2019-02-18 | End: 2019-02-18

## 2019-02-18 RX ORDER — LIDOCAINE HYDROCHLORIDE 20 MG/ML
JELLY TOPICAL
Status: DISCONTINUED | OUTPATIENT
Start: 2019-02-18 | End: 2019-02-18 | Stop reason: HOSPADM

## 2019-02-18 RX ORDER — LIDOCAINE HYDROCHLORIDE 10 MG/ML
1 INJECTION INFILTRATION; PERINEURAL
Status: DISCONTINUED | OUTPATIENT
Start: 2019-02-18 | End: 2019-02-18 | Stop reason: HOSPADM

## 2019-02-18 RX ORDER — ROPIVACAINE HYDROCHLORIDE 2 MG/ML
INJECTION, SOLUTION EPIDURAL; INFILTRATION; PERINEURAL
Status: DISCONTINUED | OUTPATIENT
Start: 2019-02-18 | End: 2019-02-18 | Stop reason: HOSPADM

## 2019-02-18 RX ORDER — KETOROLAC TROMETHAMINE 30 MG/ML
INJECTION, SOLUTION INTRAMUSCULAR; INTRAVENOUS AS NEEDED
Status: DISCONTINUED | OUTPATIENT
Start: 2019-02-18 | End: 2019-02-18 | Stop reason: HOSPADM

## 2019-02-18 RX ORDER — MORPHINE SULFATE 0.5 MG/ML
INJECTION, SOLUTION EPIDURAL; INTRATHECAL; INTRAVENOUS AS NEEDED
Status: DISCONTINUED | OUTPATIENT
Start: 2019-02-18 | End: 2019-02-18 | Stop reason: HOSPADM

## 2019-02-18 RX ORDER — SODIUM CHLORIDE 0.9 % (FLUSH) 0.9 %
5-40 SYRINGE (ML) INJECTION EVERY 8 HOURS
Status: DISCONTINUED | OUTPATIENT
Start: 2019-02-18 | End: 2019-02-20 | Stop reason: ALTCHOICE

## 2019-02-18 RX ORDER — FENTANYL CITRATE 50 UG/ML
INJECTION, SOLUTION INTRAMUSCULAR; INTRAVENOUS AS NEEDED
Status: DISCONTINUED | OUTPATIENT
Start: 2019-02-18 | End: 2019-02-18 | Stop reason: HOSPADM

## 2019-02-18 RX ORDER — DEXTROSE, SODIUM CHLORIDE, SODIUM LACTATE, POTASSIUM CHLORIDE, AND CALCIUM CHLORIDE 5; .6; .31; .03; .02 G/100ML; G/100ML; G/100ML; G/100ML; G/100ML
125 INJECTION, SOLUTION INTRAVENOUS CONTINUOUS
Status: DISCONTINUED | OUTPATIENT
Start: 2019-02-18 | End: 2019-02-19

## 2019-02-18 RX ORDER — OXYTOCIN/RINGER'S LACTATE 15/250 ML
250 PLASTIC BAG, INJECTION (ML) INTRAVENOUS ONCE
Status: DISPENSED | OUTPATIENT
Start: 2019-02-18 | End: 2019-02-18

## 2019-02-18 RX ORDER — SODIUM CHLORIDE 0.9 % (FLUSH) 0.9 %
5-40 SYRINGE (ML) INJECTION AS NEEDED
Status: DISCONTINUED | OUTPATIENT
Start: 2019-02-18 | End: 2019-02-20 | Stop reason: ALTCHOICE

## 2019-02-18 RX ORDER — DIPHENHYDRAMINE HYDROCHLORIDE 50 MG/ML
INJECTION, SOLUTION INTRAMUSCULAR; INTRAVENOUS AS NEEDED
Status: DISCONTINUED | OUTPATIENT
Start: 2019-02-18 | End: 2019-02-18 | Stop reason: HOSPADM

## 2019-02-18 RX ORDER — DEXAMETHASONE SODIUM PHOSPHATE 4 MG/ML
INJECTION, SOLUTION INTRA-ARTICULAR; INTRALESIONAL; INTRAMUSCULAR; INTRAVENOUS; SOFT TISSUE AS NEEDED
Status: DISCONTINUED | OUTPATIENT
Start: 2019-02-18 | End: 2019-02-18 | Stop reason: HOSPADM

## 2019-02-18 RX ORDER — METOCLOPRAMIDE HYDROCHLORIDE 5 MG/ML
10 INJECTION INTRAMUSCULAR; INTRAVENOUS ONCE
Status: COMPLETED | OUTPATIENT
Start: 2019-02-18 | End: 2019-02-18

## 2019-02-18 RX ORDER — FENTANYL CITRATE 50 UG/ML
INJECTION, SOLUTION INTRAMUSCULAR; INTRAVENOUS
Status: COMPLETED
Start: 2019-02-18 | End: 2019-02-18

## 2019-02-18 RX ORDER — MINERAL OIL
120 OIL (ML) ORAL
Status: DISCONTINUED | OUTPATIENT
Start: 2019-02-18 | End: 2019-02-18 | Stop reason: HOSPADM

## 2019-02-18 RX ORDER — ONDANSETRON 2 MG/ML
INJECTION INTRAMUSCULAR; INTRAVENOUS AS NEEDED
Status: DISCONTINUED | OUTPATIENT
Start: 2019-02-18 | End: 2019-02-18 | Stop reason: HOSPADM

## 2019-02-18 RX ORDER — ROPIVACAINE HYDROCHLORIDE 2 MG/ML
INJECTION, SOLUTION EPIDURAL; INFILTRATION; PERINEURAL AS NEEDED
Status: DISCONTINUED | OUTPATIENT
Start: 2019-02-18 | End: 2019-02-18 | Stop reason: HOSPADM

## 2019-02-18 RX ORDER — OXYTOCIN/RINGER'S LACTATE 30/500 ML
PLASTIC BAG, INJECTION (ML) INTRAVENOUS
Status: DISCONTINUED | OUTPATIENT
Start: 2019-02-18 | End: 2019-02-18 | Stop reason: HOSPADM

## 2019-02-18 RX ORDER — SODIUM CHLORIDE, SODIUM LACTATE, POTASSIUM CHLORIDE, CALCIUM CHLORIDE 600; 310; 30; 20 MG/100ML; MG/100ML; MG/100ML; MG/100ML
INJECTION, SOLUTION INTRAVENOUS
Status: DISCONTINUED | OUTPATIENT
Start: 2019-02-18 | End: 2019-02-18 | Stop reason: HOSPADM

## 2019-02-18 RX ORDER — BUTORPHANOL TARTRATE 2 MG/ML
1 INJECTION INTRAMUSCULAR; INTRAVENOUS
Status: DISCONTINUED | OUTPATIENT
Start: 2019-02-18 | End: 2019-02-18 | Stop reason: HOSPADM

## 2019-02-18 RX ORDER — LIDOCAINE HYDROCHLORIDE AND EPINEPHRINE 20; 5 MG/ML; UG/ML
INJECTION, SOLUTION EPIDURAL; INFILTRATION; INTRACAUDAL; PERINEURAL AS NEEDED
Status: DISCONTINUED | OUTPATIENT
Start: 2019-02-18 | End: 2019-02-18 | Stop reason: HOSPADM

## 2019-02-18 RX ORDER — OXYTOCIN/RINGER'S LACTATE 30/500 ML
0-25 PLASTIC BAG, INJECTION (ML) INTRAVENOUS
Status: DISCONTINUED | OUTPATIENT
Start: 2019-02-18 | End: 2019-02-18 | Stop reason: HOSPADM

## 2019-02-18 RX ORDER — CEFAZOLIN SODIUM/WATER 2 G/20 ML
2 SYRINGE (ML) INTRAVENOUS ONCE
Status: COMPLETED | OUTPATIENT
Start: 2019-02-18 | End: 2019-02-18

## 2019-02-18 RX ADMIN — Medication 300 ML/HR: at 22:35

## 2019-02-18 RX ADMIN — KETOROLAC TROMETHAMINE 30 MG: 30 INJECTION, SOLUTION INTRAMUSCULAR; INTRAVENOUS at 23:19

## 2019-02-18 RX ADMIN — Medication 10 MILLI-UNITS/MIN: at 13:00

## 2019-02-18 RX ADMIN — MORPHINE SULFATE 4 MG: 0.5 INJECTION, SOLUTION EPIDURAL; INTRATHECAL; INTRAVENOUS at 23:16

## 2019-02-18 RX ADMIN — SODIUM CHLORIDE, SODIUM LACTATE, POTASSIUM CHLORIDE, CALCIUM CHLORIDE: 600; 310; 30; 20 INJECTION, SOLUTION INTRAVENOUS at 22:10

## 2019-02-18 RX ADMIN — Medication 2 MILLI-UNITS/MIN: at 06:08

## 2019-02-18 RX ADMIN — ROPIVACAINE HYDROCHLORIDE 10 ML/HR: 2 INJECTION, SOLUTION EPIDURAL; INFILTRATION; PERINEURAL at 07:58

## 2019-02-18 RX ADMIN — METOCLOPRAMIDE 10 MG: 5 INJECTION, SOLUTION INTRAMUSCULAR; INTRAVENOUS at 21:57

## 2019-02-18 RX ADMIN — DEXAMETHASONE SODIUM PHOSPHATE 5 MG: 4 INJECTION, SOLUTION INTRA-ARTICULAR; INTRALESIONAL; INTRAMUSCULAR; INTRAVENOUS; SOFT TISSUE at 22:44

## 2019-02-18 RX ADMIN — SODIUM CHLORIDE, SODIUM LACTATE, POTASSIUM CHLORIDE, AND CALCIUM CHLORIDE 500 ML: 600; 310; 30; 20 INJECTION, SOLUTION INTRAVENOUS at 06:09

## 2019-02-18 RX ADMIN — FAMOTIDINE 20 MG: 10 INJECTION, SOLUTION INTRAVENOUS at 21:54

## 2019-02-18 RX ADMIN — AZITHROMYCIN MONOHYDRATE 500 MG: 500 INJECTION, POWDER, LYOPHILIZED, FOR SOLUTION INTRAVENOUS at 22:08

## 2019-02-18 RX ADMIN — FENTANYL CITRATE 100 MCG: 50 INJECTION, SOLUTION INTRAMUSCULAR; INTRAVENOUS at 07:57

## 2019-02-18 RX ADMIN — Medication 2 G: at 21:54

## 2019-02-18 RX ADMIN — SODIUM CITRATE AND CITRIC ACID MONOHYDRATE 30 ML: 500; 334 SOLUTION ORAL at 21:54

## 2019-02-18 RX ADMIN — ROPIVACAINE HYDROCHLORIDE 8 ML: 2 INJECTION, SOLUTION EPIDURAL; INFILTRATION; PERINEURAL at 07:57

## 2019-02-18 RX ADMIN — AZITHROMYCIN MONOHYDRATE 500 MG: 500 INJECTION, POWDER, LYOPHILIZED, FOR SOLUTION INTRAVENOUS at 21:58

## 2019-02-18 RX ADMIN — Medication 8 MILLI-UNITS/MIN: at 08:37

## 2019-02-18 RX ADMIN — DIPHENHYDRAMINE HYDROCHLORIDE 25 MG: 50 INJECTION, SOLUTION INTRAMUSCULAR; INTRAVENOUS at 22:41

## 2019-02-18 RX ADMIN — Medication 6 MILLI-UNITS/MIN: at 08:02

## 2019-02-18 RX ADMIN — SODIUM CHLORIDE, SODIUM LACTATE, POTASSIUM CHLORIDE, AND CALCIUM CHLORIDE 1000 ML: 600; 310; 30; 20 INJECTION, SOLUTION INTRAVENOUS at 22:02

## 2019-02-18 RX ADMIN — LIDOCAINE HYDROCHLORIDE AND EPINEPHRINE 15 ML: 20; 5 INJECTION, SOLUTION EPIDURAL; INFILTRATION; INTRACAUDAL; PERINEURAL at 22:05

## 2019-02-18 RX ADMIN — SODIUM CHLORIDE, SODIUM LACTATE, POTASSIUM CHLORIDE, CALCIUM CHLORIDE, AND DEXTROSE MONOHYDRATE 125 ML/HR: 600; 310; 30; 20; 5 INJECTION, SOLUTION INTRAVENOUS at 14:19

## 2019-02-18 RX ADMIN — PROMETHAZINE HYDROCHLORIDE 12.5 MG: 25 INJECTION INTRAMUSCULAR; INTRAVENOUS at 14:16

## 2019-02-18 RX ADMIN — ONDANSETRON 4 MG: 2 INJECTION INTRAMUSCULAR; INTRAVENOUS at 22:35

## 2019-02-18 RX ADMIN — Medication 4 MILLI-UNITS/MIN: at 20:45

## 2019-02-18 RX ADMIN — Medication 2 MILLI-UNITS/MIN: at 19:07

## 2019-02-18 RX ADMIN — SODIUM CHLORIDE, SODIUM LACTATE, POTASSIUM CHLORIDE, CALCIUM CHLORIDE, AND DEXTROSE MONOHYDRATE 125 ML/HR: 600; 310; 30; 20; 5 INJECTION, SOLUTION INTRAVENOUS at 06:09

## 2019-02-18 RX ADMIN — SODIUM CHLORIDE, SODIUM LACTATE, POTASSIUM CHLORIDE, CALCIUM CHLORIDE: 600; 310; 30; 20 INJECTION, SOLUTION INTRAVENOUS at 21:23

## 2019-02-18 NOTE — ANESTHESIA PROCEDURE NOTES
Epidural Block Start time: 2/18/2019 7:50 AM 
End time: 2/18/2019 7:54 AM 
Performed by: Da Jain MD 
Authorized by: Da Jain MD  
 
Pre-Procedure Indication: at surgeon's request, procedure for pain and labor epidural   
Preanesthetic Checklist: patient identified, risks and benefits discussed, anesthesia consent, site marked, patient being monitored, timeout performed and anesthesia consent Timeout Time: 07:45 Epidural:  
Patient position:  Seated Prep region:  Lumbar Prep: Chlorhexidine Location:  L2-3 Needle and Epidural Catheter:  
Needle Type:  Tuohy Needle Gauge:  19 G Injection Technique:  Loss of resistance using air and loss of resistance using saline Attempts:  1 Catheter Size:  20 G Catheter at Skin Depth (cm):  8.5 Depth in Epidural Space (cm):  5 Events: no blood with aspiration, no cerebrospinal fluid with aspiration, no paresthesia and negative aspiration test   
Test Dose:  Lidocaine 1.5% w/ epi and negative Assessment:  
Catheter Secured:  Tegaderm and tape Insertion:  Uncomplicated Patient tolerance:  Patient tolerated the procedure well with no immediate complications

## 2019-02-18 NOTE — PROGRESS NOTES
Labor Progress Note Patient seen, fetal heart rate and contraction pattern evaluated, patient examined. Patient Vitals for the past 1 hrs: 
 BP Temp Pulse Resp  
02/18/19 1220 127/78  70   
02/18/19 1204 112/67 98.4 °F (36.9 °C) 68 17  
02/18/19 1149 107/71  61  Physical Exam: 
Cervical Exam:  6 cm dilated 100% effaced   
-1 station Membranes:  Premature Rupture of Membranes; Amniotic Fluid: clear fluid Uterine Activity: Intensity: strong Fetal Heart Rate: Reactive Assessment/Plan: 
Reassuring fetal status, Continue plan for vaginal delivery

## 2019-02-18 NOTE — PROGRESS NOTES
[id number] Bedside and Verbal shift change report given to NORBERT Christianson RN (oncoming nurse) by MORENO Segura RN (offgoing nurse). Report included the following information SBAR.  
0720 Pt up to bathroom with RN 
0726 Pt. Requesting epidural. SVE per NORBERT Christianson RN 2/90/-2. Dr Evelyn Gomez called per RN. Orders for epidural received. 0523 Fluid bolus started 26 Dr. Afshan Rasheed and Jean Spatz CRNA at bedside for epidural placement. Epidural placed without difficulty per Dr. Afshan Rasheed. See Anesthesia note. 7387 Dr. Evelny Gomez at bedside for maternal and fetal assessment. Dr. Evelyn Gomez reviewing [address] tracing. No new orders received. 0935 Sterile straight cath per  cc clear yellow urine drained. SVE per RN 4/90/-2  Decel. Noted while pt is on her back. Repositioned to left side with peanut ball. 1210 Sterile straight cath per  cc clear yellow urine drained. SVE per RN 6/90/-1. Decel. Noted while pt is on her back. Repositioned to right side with peanut ball. 18 Dr. Evelyn Gomez at bedside for maternal and fetal assessment. Dr. Evelyn Gomez reviewing [address] tracing. Orders for Phenergan as needed received. Late decels. Noted. Pitocin decrease to 10mu/min 1414 Pt. Called out requesting Phenergan. RN to bedside. Sterile straight cath per  cc clear yellow urine. SVE per RN 7/90/-1. Decel noted. Pt. Repositioned to left side with peanut ball. [address] noted. RN to bedside. Sterile straight cath per RN. 300 cc clear yellow urine drained. SVE per RN  8/100/-1. Pt. Repositioned to right side with peanut ball.  
1603 Late decels noted. RN to bedside. Pitocin decreased to 5mu/min. Dr. Evelyn Gomez called per RN 
1921 Dr. Evelyn Gomez at bedside for maternal and fetal assessment. Dr. Evelyn Gomez reviewing [address] tracing. SVE per Dr. Evelyn Gomez 9/100/+1 
1810 Sterile straight cath per  cc clear yellow urine. SVE per RN. 9/100/+1.  
Christian Gomez at bedside for maternal and fetal assessment. Dr. Evelyn Gomez reviewing FHR pattern. SVE per Dr. Brigido Robb 9/100/+1.  Orders received to restart Pitocin

## 2019-02-18 NOTE — ANESTHESIA PREPROCEDURE EVALUATION
Anesthetic History No history of anesthetic complications Review of Systems / Medical History Patient summary reviewed and pertinent labs reviewed Pulmonary Within defined limits Neuro/Psych Within defined limits Cardiovascular Hypertension (gestational) Exercise tolerance: >4 METS 
  
GI/Hepatic/Renal 
Within defined limits Endo/Other Within defined limits Other Findings Physical Exam 
 
Airway Mallampati: I 
TM Distance: 4 - 6 cm Neck ROM: normal range of motion Mouth opening: Normal 
 
 Cardiovascular Rhythm: regular Rate: normal 
 
 
 
 Dental 
 
 
  
Pulmonary Breath sounds clear to auscultation Abdominal 
 
 
 
 Other Findings Anesthetic Plan ASA: 2, emergent Anesthesia type: epidural 
 
 
Post-op pain plan if not by surgeon: epidural opioid Anesthetic plan and risks discussed with: Patient and Spouse Epidural to C/S for FTP

## 2019-02-18 NOTE — PROGRESS NOTES
Labor Progress Note Patient seen, fetal heart rate and contraction pattern evaluated, patient examined. Patient Vitals for the past 1 hrs: 
 BP Temp Pulse Resp  
02/18/19 1649 118/76  81   
02/18/19 1633 121/79  89   
02/18/19 1620 119/75 98.4 °F (36.9 °C) 76 17 Physical Exam: 
Cervical Exam:  9 cm dilated 100% effaced +1 station Membranes:  Premature Rupture of Membranes; Amniotic Fluid: clear fluid Uterine Activity: Intensity: strong Fetal Heart Rate: Reactive, +FSS Assessment/Plan: 
Reassuring fetal status, Continue plan for vaginal delivery

## 2019-02-18 NOTE — H&P
History & Physical 
 
Name: Allen Smith MRN: [id number]  SSN: [id number] YOB: 1990  Age: 29 y.o. Sex: female Subjective:  
 
Estimated Date of Delivery: 3/5/19 OB History  Para Term  AB Living 1 0 0 0 0 0 SAB TAB Ectopic Molar Multiple Live Births  
0 0 0   0 # Outcome Date GA Lbr Geraldo/2nd Weight Sex Delivery Anes PTL Lv  
1 Current Ms. Moses Warner is admitted with pregnancy at 37w6d for Presumptive ROM . Prenatal course was complicated by pregnancy induced hypertension. Please see prenatal records for details. Past Medical History:  
Diagnosis Date  Abnormal Papanicolaou smear of cervix  Anemia  Dysmenorrhea   
 better on OCP  
 Hx of abnormal Pap smear 13 neg pap, +HPV neg 16 and18;2012 ASCUS pap,+HPV; 2011 neg pap, +HPV neg 16 and 18  Hypertension affecting pregnancy, third trimester 2019  Psychiatric problem  Traumatic injury during pregnancy, antepartum, third trimester 2019 Past Surgical History:  
Procedure Laterality Date  HX COLPOSCOPY  4/15/13  
 4/13 neg;3/12 CHRISTIE I endo and ecto;3/11 CHRISTIE I endo and ecto Social History Occupational History  Not on file Tobacco Use  Smoking status: Never Smoker  Smokeless tobacco: Never Used Substance and Sexual Activity  Alcohol use: Yes Comment: none with +UPT  Drug use: No  
 Sexual activity: Yes  
  Partners: Male Birth control/protection: None Family History Problem Relation Age of Onset  Diabetes Paternal Aunt  Hypertension Maternal Grandmother  Diabetes Maternal Grandmother  No Known Problems Mother  No Known Problems Father  Diabetes Maternal Grandfather  No Known Problems Paternal Grandmother  No Known Problems Paternal Grandfather No Known Allergies Prior to Admission medications Medication Sig Start Date End Date Taking? Authorizing Provider diphenhydrAMINE (BENADRYL ALLERGY) 25 mg tablet Take 25 mg by mouth every six (6) hours as needed for Sleep. Yes Provider, Historical  
Prenatal Vit27&Calcium-Iron-FA 60 mg iron-1 mg tab Take  by mouth. Yes Provider, Historical  
acetaminophen (TYLENOL) 325 mg cap Take  by mouth. Yes Provider, Historical  
  
 
Review of Systems: A comprehensive review of systems was negative except for that written in the HPI. Objective:  
 
Vitals: 
Vitals:  
 02/18/19 0458 BP: (!) 134/93 Pulse: 78 Physical Exam: 
Patient without distress. Heart: Regular rate and rhythm Lung: clear to auscultation throughout lung fields, no wheezes, no rales, no rhonchi and normal respiratory effort Abdomen: soft, nontender Cervical Exam: 2 cm dilated 90% effaced   
-2 station Membranes:  Premature Rupture of Membranes; Amniotic Fluid: clear fluid Fetal Heart Rate: Reactive Prenatal Labs:  
Lab Results Component Value Date/Time ABO/Rh(D) O POSITIVE 02/18/2019 04:55 AM  
 Rubella, External immune 07/25/2018 GrBStrep, External Negative 02/04/2019 HBsAg, External negative 07/25/2018 HIV, External NR 07/25/2018 RPR, External NR 07/25/2018 Gonorrhea, External negative 08/22/2018 Chlamydia, External negative 08/22/2018 ABO,Rh O positive 07/25/2018 Assessment/Plan: Active Problems: 
  37 weeks gestation of pregnancy (2/18/2019) Gestational HTN, third trimester (2/18/2019) Encounter for planned induction of labor (2/18/2019) Plan: Admit for Reassuring fetal status, Labor  Not progressing normally  start pitocin augmentation, Continue plan for vaginal delivery. Group B Strep was negative. Signed By:  Corey Dubois MD   
 February 18, 2019

## 2019-02-18 NOTE — PROGRESS NOTES
Pt to room 426 for IOL. IV started, consents signed. Labs drawn and sent to lab. Admission database complete. Pt denies needs at this time. Encouraged to call out PRN. Pt voiced understanding.

## 2019-02-19 LAB
ARTERIAL PATENCY WRIST A: ABNORMAL
BASE DEFICIT BLD-SCNC: 5 MMOL/L
BDY SITE: ABNORMAL
BODY TEMPERATURE: 98.6
CO2 BLD-SCNC: 23 MMOL/L
COLLECT TIME,HTIME: 2228
GAS FLOW.O2 O2 DELIVERY SYS: ABNORMAL L/MIN
HCO3 BLDV-SCNC: 21.4 MMOL/L (ref 23–28)
HCT VFR BLD AUTO: 38.8 % (ref 35.8–46.3)
HGB BLD-MCNC: 13 G/DL (ref 11.7–15.4)
PCO2 BLDCO: 45 MMHG (ref 32–68)
PH BLDCO: 7.28 [PH] (ref 7.15–7.38)
PO2 BLDCO: 22 MMHG
SAO2 % BLDV: 31 % (ref 65–88)
SERVICE CMNT-IMP: ABNORMAL
SPECIMEN TYPE: ABNORMAL

## 2019-02-19 PROCEDURE — 77030020255 HC SOL INJ LR 1000ML BG

## 2019-02-19 PROCEDURE — 65270000029 HC RM PRIVATE

## 2019-02-19 PROCEDURE — 74011250636 HC RX REV CODE- 250/636: Performed by: ANESTHESIOLOGY

## 2019-02-19 PROCEDURE — 74011250637 HC RX REV CODE- 250/637: Performed by: ANESTHESIOLOGY

## 2019-02-19 PROCEDURE — 36415 COLL VENOUS BLD VENIPUNCTURE: CPT

## 2019-02-19 PROCEDURE — 74011250637 HC RX REV CODE- 250/637: Performed by: OBSTETRICS & GYNECOLOGY

## 2019-02-19 PROCEDURE — 85018 HEMOGLOBIN: CPT

## 2019-02-19 RX ORDER — SODIUM CHLORIDE, SODIUM LACTATE, POTASSIUM CHLORIDE, CALCIUM CHLORIDE 600; 310; 30; 20 MG/100ML; MG/100ML; MG/100ML; MG/100ML
100 INJECTION, SOLUTION INTRAVENOUS CONTINUOUS
Status: DISCONTINUED | OUTPATIENT
Start: 2019-02-19 | End: 2019-02-19

## 2019-02-19 RX ORDER — DIPHENHYDRAMINE HCL 25 MG
25 CAPSULE ORAL
Status: DISCONTINUED | OUTPATIENT
Start: 2019-02-19 | End: 2019-02-19

## 2019-02-19 RX ORDER — HYDROMORPHONE HYDROCHLORIDE 2 MG/ML
1 INJECTION, SOLUTION INTRAMUSCULAR; INTRAVENOUS; SUBCUTANEOUS
Status: DISCONTINUED | OUTPATIENT
Start: 2019-02-19 | End: 2019-02-19

## 2019-02-19 RX ORDER — SODIUM CHLORIDE 0.9 % (FLUSH) 0.9 %
5-40 SYRINGE (ML) INJECTION AS NEEDED
Status: DISCONTINUED | OUTPATIENT
Start: 2019-02-19 | End: 2019-02-20 | Stop reason: ALTCHOICE

## 2019-02-19 RX ORDER — DOCUSATE SODIUM 100 MG/1
100 CAPSULE, LIQUID FILLED ORAL 2 TIMES DAILY
Status: DISCONTINUED | OUTPATIENT
Start: 2019-02-19 | End: 2019-02-22 | Stop reason: HOSPADM

## 2019-02-19 RX ORDER — NALOXONE HYDROCHLORIDE 0.4 MG/ML
0.2 INJECTION, SOLUTION INTRAMUSCULAR; INTRAVENOUS; SUBCUTANEOUS
Status: DISCONTINUED | OUTPATIENT
Start: 2019-02-19 | End: 2019-02-19

## 2019-02-19 RX ORDER — NALBUPHINE HYDROCHLORIDE 10 MG/ML
5 INJECTION, SOLUTION INTRAMUSCULAR; INTRAVENOUS; SUBCUTANEOUS
Status: DISCONTINUED | OUTPATIENT
Start: 2019-02-19 | End: 2019-02-19

## 2019-02-19 RX ORDER — OXYCODONE AND ACETAMINOPHEN 7.5; 325 MG/1; MG/1
1 TABLET ORAL
Status: DISCONTINUED | OUTPATIENT
Start: 2019-02-19 | End: 2019-02-22 | Stop reason: HOSPADM

## 2019-02-19 RX ORDER — ACETAMINOPHEN 325 MG/1
650 TABLET ORAL
Status: DISCONTINUED | OUTPATIENT
Start: 2019-02-19 | End: 2019-02-19 | Stop reason: ALTCHOICE

## 2019-02-19 RX ORDER — SODIUM CHLORIDE 0.9 % (FLUSH) 0.9 %
5-40 SYRINGE (ML) INJECTION EVERY 8 HOURS
Status: DISCONTINUED | OUTPATIENT
Start: 2019-02-19 | End: 2019-02-20 | Stop reason: ALTCHOICE

## 2019-02-19 RX ORDER — SIMETHICONE 80 MG
80 TABLET,CHEWABLE ORAL
Status: DISCONTINUED | OUTPATIENT
Start: 2019-02-19 | End: 2019-02-22 | Stop reason: HOSPADM

## 2019-02-19 RX ORDER — OXYCODONE AND ACETAMINOPHEN 7.5; 325 MG/1; MG/1
2 TABLET ORAL
Status: DISCONTINUED | OUTPATIENT
Start: 2019-02-19 | End: 2019-02-22 | Stop reason: HOSPADM

## 2019-02-19 RX ORDER — ACETAMINOPHEN 325 MG/1
650 TABLET ORAL
Status: DISCONTINUED | OUTPATIENT
Start: 2019-02-19 | End: 2019-02-19

## 2019-02-19 RX ORDER — OXYCODONE HYDROCHLORIDE 5 MG/1
10 TABLET ORAL
Status: DISCONTINUED | OUTPATIENT
Start: 2019-02-19 | End: 2019-02-22 | Stop reason: HOSPADM

## 2019-02-19 RX ORDER — OXYCODONE AND ACETAMINOPHEN 7.5; 325 MG/1; MG/1
2 TABLET ORAL
Status: DISCONTINUED | OUTPATIENT
Start: 2019-02-19 | End: 2019-02-19

## 2019-02-19 RX ORDER — ZOLPIDEM TARTRATE 5 MG/1
5 TABLET ORAL
Status: DISCONTINUED | OUTPATIENT
Start: 2019-02-19 | End: 2019-02-22 | Stop reason: HOSPADM

## 2019-02-19 RX ORDER — OXYCODONE HYDROCHLORIDE 5 MG/1
10 TABLET ORAL
Status: DISCONTINUED | OUTPATIENT
Start: 2019-02-19 | End: 2019-02-19

## 2019-02-19 RX ORDER — KETOROLAC TROMETHAMINE 30 MG/ML
30 INJECTION, SOLUTION INTRAMUSCULAR; INTRAVENOUS EVERY 6 HOURS
Status: DISCONTINUED | OUTPATIENT
Start: 2019-02-19 | End: 2019-02-19 | Stop reason: SDUPTHER

## 2019-02-19 RX ORDER — OXYCODONE AND ACETAMINOPHEN 7.5; 325 MG/1; MG/1
1 TABLET ORAL
Status: DISCONTINUED | OUTPATIENT
Start: 2019-02-19 | End: 2019-02-19

## 2019-02-19 RX ORDER — OXYCODONE HYDROCHLORIDE 5 MG/1
5 TABLET ORAL
Status: DISCONTINUED | OUTPATIENT
Start: 2019-02-19 | End: 2019-02-19

## 2019-02-19 RX ORDER — LOPERAMIDE HYDROCHLORIDE 2 MG/1
4 CAPSULE ORAL
Status: DISCONTINUED | OUTPATIENT
Start: 2019-02-19 | End: 2019-02-22 | Stop reason: HOSPADM

## 2019-02-19 RX ORDER — KETOROLAC TROMETHAMINE 10 MG/1
10 TABLET, FILM COATED ORAL EVERY 6 HOURS
Status: DISCONTINUED | OUTPATIENT
Start: 2019-02-19 | End: 2019-02-22 | Stop reason: HOSPADM

## 2019-02-19 RX ORDER — DIPHENHYDRAMINE HCL 25 MG
25 CAPSULE ORAL
Status: DISCONTINUED | OUTPATIENT
Start: 2019-02-19 | End: 2019-02-22 | Stop reason: HOSPADM

## 2019-02-19 RX ADMIN — NALBUPHINE HYDROCHLORIDE 5 MG: 10 INJECTION, SOLUTION INTRAMUSCULAR; INTRAVENOUS; SUBCUTANEOUS at 22:07

## 2019-02-19 RX ADMIN — ACETAMINOPHEN 650 MG: 325 TABLET, FILM COATED ORAL at 09:15

## 2019-02-19 RX ADMIN — SODIUM CHLORIDE, SODIUM LACTATE, POTASSIUM CHLORIDE, AND CALCIUM CHLORIDE 100 ML/HR: 600; 310; 30; 20 INJECTION, SOLUTION INTRAVENOUS at 05:39

## 2019-02-19 RX ADMIN — SIMETHICONE CHEW TAB 80 MG 80 MG: 80 TABLET ORAL at 21:48

## 2019-02-19 RX ADMIN — KETOROLAC TROMETHAMINE 10 MG: 10 TABLET, FILM COATED ORAL at 21:48

## 2019-02-19 RX ADMIN — DOCUSATE SODIUM 100 MG: 100 CAPSULE ORAL at 21:48

## 2019-02-19 RX ADMIN — ZOLPIDEM TARTRATE 5 MG: 5 TABLET ORAL at 22:08

## 2019-02-19 RX ADMIN — KETOROLAC TROMETHAMINE 10 MG: 10 TABLET, FILM COATED ORAL at 13:11

## 2019-02-19 RX ADMIN — ACETAMINOPHEN 650 MG: 325 TABLET, FILM COATED ORAL at 02:56

## 2019-02-19 RX ADMIN — NALBUPHINE HYDROCHLORIDE 5 MG: 10 INJECTION, SOLUTION INTRAMUSCULAR; INTRAVENOUS; SUBCUTANEOUS at 05:00

## 2019-02-19 NOTE — PROGRESS NOTES
O2 applied via non rebreather for questionable late decelerations. Tectonic contractions noted with subtle decelerations x 2.

## 2019-02-19 NOTE — PROGRESS NOTES
Repositioned to high fowlers for laboring down. Pt reports increased pressure with contractions in this position.

## 2019-02-19 NOTE — ANESTHESIA POSTPROCEDURE EVALUATION
Procedure(s):  SECTION. Anesthesia Post Evaluation Multimodal analgesia: multimodal analgesia used between 6 hours prior to anesthesia start to PACU discharge Patient location during evaluation: PACU Patient participation: complete - patient participated Level of consciousness: awake and alert Pain management: adequate Airway patency: patent Anesthetic complications: no 
Cardiovascular status: acceptable and hemodynamically stable Respiratory status: acceptable Hydration status: acceptable Visit Vitals /64 (BP 1 Location: Right arm, BP Patient Position: At rest) Pulse 62 Temp 36.9 °C (98.4 °F) Resp 18 SpO2 96% Breastfeeding?  Yes

## 2019-02-19 NOTE — PROGRESS NOTES
Dr. Shaina Gresham updated via phone: Pt c/o pressure. SVE performed with audible scalp stim. No cervical change noted. O2 off as FHTs are reassuring with moderate variability and acceleration noted. Titus approximately q 5 minutes. Pitocin increased to 4mu/min 2045. Prolonged deceleration noted with alvin in the 100s at 2050. Baseline FHTs 155 with min variability since decel. Doctor states ok to keep Pitocin running since acceleration noted.

## 2019-02-19 NOTE — PROGRESS NOTES
SBAR IN Report: Mother Verbal report received from Kimberly Hernández RN on this patient, who is now being transferred from labor and delivery for routine progression of care. The patient is wearing a green \"Anesthesia-Duramorph\" band. Report consisted of patient's Situation, Background, Assessment and Recommendations (SBAR).  ID bands were compared with the identification form, and verified with the patient and transferring nurse. Information from the SBAR, Kardex, OR Summary, Intake/Output, MAR and Recent Results and the Rosalie Report was reviewed with the transferring nurse; opportunity for questions and clarification provided.

## 2019-02-19 NOTE — PROGRESS NOTES
Post-Operative Day Number 1 Progress Note Patient doing well post-op day 1 from  delivery without significant complaints. Pain controlled on current medication. Voiding without difficulty, normal lochia. Vitals:   
Patient Vitals for the past 8 hrs: 
 BP Temp Pulse Resp SpO2  
19 0737 114/69 98.4 °F (36.9 °C) 72 18 96 % 19 0445 106/64 98.4 °F (36.9 °C) 62 18 96 % 19 0345 104/65 98.3 °F (36.8 °C) 60 16 96 % 19 0245 128/71 98.8 °F (37.1 °C) 60 16 97 % 19 0145 (!) 147/96  (!) 58 16 100 % 19 0130 (!) 156/95  (!) 52 16 100 % 19 0115 (!) 152/97  (!) 56 16 100 % 19 0100 (!) 156/98  78 18 100 % 19 0045 (!) 154/96  62 18 100 % 19 0030 (!) 153/97  62 18 95 % 19 0015 158/90  68 16 98 % Temp (24hrs), Av.6 °F (37 °C), Min:98.3 °F (36.8 °C), Max:99.1 °F (37.3 °C) Vital signs stable, afebrile. Exam:  Patient without distress. Abdomen soft, fundus firm at level of umbilicus, non tender. Incision dry and clean without erythema. Lower extremities are negative for swelling, cords or tenderness. Lab/Data Review: CBC:  
Lab Results Component Value Date/Time HGB 13.0 2019 06:30 AM  
 HCT 38.8 2019 06:30 AM  
 
 
Assessment and Plan:  Patient appears to be having uncomplicated post- course. Continue routine post-op care and maternal education.

## 2019-02-19 NOTE — PROGRESS NOTES
Baseline FHTs 150s with min-mod variability. Prolonged deceleration noted lasting approximately 170 seconds with alvin in the 120's. O2 applied via non rebreather mask at 10L/min. Repositioned to R side lying with peanut ball. FHTs 150s with min variability. Pitocin remains on a 2mu/min. Titus q 2-3.5min. Strong to palpation.

## 2019-02-19 NOTE — OP NOTES
[place]  OPERATIVE REPORT    Name:  Curtis Moran  MR#:  [id number]  :  1990  ACCOUNT #:  [de-identified]  DATE OF SERVICE:  2019      PREOPERATIVE DIAGNOSIS:  A 37-6/7-week intrauterine pregnancy with secondary arrest  of dilatation with  premature rupture of membranes. POSTOPERATIVE DIAGNOSIS:  A 37-6/7-week intrauterine pregnancy with secondary arrest  of dilatation with  premature rupture of membranes with operative delivery at  10:28 p.m. on 2019 with delivery of a male infant with Apgars 9 and 9, weighing  2.655 kg (5 pounds 14 ounces) named Osvaldo Muhammad). PROCEDURE PERFORMED:  Primary low transverse . SURGEON:  Sanjay Almeida MD    ASSISTANT:  _____. ANESTHESIA:  Continuous lumbar epidural.    COMPLICATIONS:  None. SPECIMENS REMOVED:  None    IMPLANTS:  _____. ESTIMATED BLOOD LOSS:  700 mL. DRAINS:  Restrepo. FINDINGS:  The patient with an OP presentation but failure to change after presenting  up to 9 cm, presented to that position for 4-1/2 hours with no further change despite  Pitocin augmentation with continuing increase in the Pitocin, late decels began  occurring with progressing. PROCEDURE:  Taken to the OR, good working level obtained on her epidural, she was  prepped and draped in the usual sterile fashion. A time-out first performed. Noting  good level on her epidural, a transverse Pfannenstiel skin incision was made, carried  down to the fascia, and nicked in the midline, extended up bilaterally, and dissected  superiorly and inferiorly up to rectus muscles. All bleeding was controlled with  electrocautery. The peritoneum was entered in the midline, extended up superiorly  and then inferiorly. A bladder flap created. Hysterotomy incision then made. Infant was then delivered up on the abdomen and bulb suctioned.   The rest was then  delivered out of the abdomen and the cord was delayed cord clamping with milking and  then clamped and cutting, and the infant was passed off to the awaiting attendants,  who ordered both Apgars. The placenta was extracted and the uterus exteriorized and  wrapped in a wet pad and evacuated. The uterine incision was closed in two layers;  the first was running of Vicryl and the second imbricating of Monocryl with good  hemostasis and the cul-de-sac was irrigated and then the uterus returned to its  original place in the abdominal cavity. Both paracolic gutters were irrigated and  then the bladder flap was closed with some 3-0 Monocryl in a running fashion. The  anterior peritoneum was then closed with some 2-0 Monocryl. Rectus muscles were  irrigated, bleeding controlled with electrocautery and reapproximated the fascia with  0 PDS in a running fashion x2, subcutaneous tissue was irrigated, bleeding controlled  with electrocautery and reapproximated with 2-0 plain. The skin was then closed with  subcuticular 4-0 Monocryl. All needle, instrument, and sponge counts correct x2. The patient back to the room with infant both in good condition.         Nakita Martin MD [id number]  D:  02/18/2019 23:23  T:  02/19/2019 11:11  JOB #:  [id number]  CC:  [id number] CIQUAL

## 2019-02-19 NOTE — PROGRESS NOTES
SBAR OUT Report: Mother Verbal report given to DEV Kent RN (full name & credentials) on this patient, who is now being transferred to MIU (unit) for routine post - op. The patient is wearing a green \"Anesthesia-Duramorph\" band. Report consisted of patient's Situation, Background, Assessment and Recommendations (SBAR).  ID bands were compared with the identification form, and verified with the patient and receiving nurse. Information from the SBAR and Intake/Output and the Rosalie Report was reviewed with the receiving nurse; opportunity for questions and clarification provided.

## 2019-02-19 NOTE — L&D DELIVERY NOTE
Delivery Note    Obstetrician:  Tammy Levi MD    Assistant: none    Pre-Delivery Diagnosis: Term pregnancy, Spontaneous labor, Single fetus and Pregnancy complicated by: GH, anxiety, and PROM    Post-Delivery Diagnosis: Living  infant(s), Male and named \"Kayden\", Yousif    Intrapartum Event: Cephalopelvic disproportion    Procedure: Primary Low Transverse  section    Delivery Summary    Patient: Kaylah Doyle MRN: [id number]  SSN: [id number]    YOB: 1990  Age: 29 y.o. Sex: female        Information for the patient's :  Dheeraj Rosa [[id number]]       Labor Events:    Labor: No   Rupture Date: 2019   Rupture Time: 3:00 AM   Rupture Type Intact;PROM   Amniotic Fluid Volume: Moderate    Amniotic Fluid Description: Clear None   Induction: None       Augmentation: Oxytocin   Labor Events: None     Cervical Ripening:     None     Delivery Events:  Episiotomy: None   Laceration(s): None     Repaired: None    Number of Repair Packets:     Suture Type and Size: None     Estimated Blood Loss (ml): 700.00ml       Delivery Date: 2019    Delivery Time: 10:28 PM  Delivery Type: , Low Transverse   Details    Trial of Labor: Yes   Primary/Repeat: Primary   Priority: Emergency   Indications:  Failure to Progress   Incision type:     Sex:  Male     Gestational Age: 41w10d  Delivery Clinician:  Tammy Levi  Living Status: Living   Delivery Location: L&D OR2           APGARS  One minute Five minutes Ten minutes   Skin color: 1   1        Heart rate: 2   2        Grimace: 2   2        Muscle tone: 2   2        Breathin   2        Totals: 9   9          Presentation: Vertex    Position: Middle Occiput Posterior  Resuscitation Method:  Tactile Stimulation;Suctioning-bulb     Meconium Stained: None      Cord Information: 3 Vessels  Complications: None  Cord around:    Delayed cord clamping?  Yes  Cord clamped date/time:   Disposition of Cord Blood: Lab Blood Gases Sent?: Yes    Placenta:  Date/Time: 2019 10:29 PM  Removal: Manual Removal      Appearance: Normal;Intact     Rhodelia Measurements:  Birth Weight: 2.655 kg      Birth Length: 51 cm      Head Circumference: 31 cm      Chest Circumference: 29.5 cm     Abdominal Girth: Other Providers:   Janeth LAWS;KATERIN MANSFIELD;SHEILA DANIELS;ALEXANDRE BOO;CHEPE MERCADO;YESENIA RICHARD;GARCÍA LASSITER, Obstetrician;Primary Nurse;Primary Rhodelia Nurse;Scrub Tech;Scrub Tech;Crna; Anesthesiologist             Group B Strep:   Lab Results   Component Value Date/Time    GrBStrep, External Negative 2019     Information for the patient's :  Perez Abreu [[id number]]   No results found for: [address], PCTABR, PCTDIG, BILI, ABORHEXT, [address]    Recent Labs     19  2240   PCO2CB 61   PO2CB 11   HCO3I 24.5   SO2I 8*   IBD 5   PTEMPI 98.6   SPECTI ARTERIAL CORD   PHICB 7.227   ISITE CORD   IDEV ROOM AIR   IALLEN NOT APPLICABLE                 Complications:  none           Cord Blood Results:   Information for the patient's :  Perez Abreu [[id number]]   No results found for: PCTABR, PCTDIG, BILI, ABORH    Prenatal Labs:     Lab Results   Component Value Date/Time    ABO/Rh(D) O POSITIVE 2019 04:55 AM    HBsAg, External negative 2018    HIV, External NR 2018    Rubella, External immune 2018    RPR, External NR 2018    Gonorrhea, External negative 2018    Chlamydia, External negative 2018    GrBStrep, External Negative 2019      See dictation # [id number]    Attending Attestation: I was present and scrubbed for the entire procedure    Signed By:  Linda Salcedo MD     2019

## 2019-02-19 NOTE — LACTATION NOTE
This note was copied from a baby's chart. In to see mom and infant for first time. RN updated me that baby is coomb's positive. Baby's  blood sugar checks have all been good. Per mom baby has latched and fed twice since birth. Reviewed admission info and 1st 24 hr feeding/output expectations. Infant skin to skin with mom now and starting to show early feeding cues. Offered to do feeding observation/assist mom and mom in agreeance. Reviewed hand expression and mom easily brings colostrum to the surface. Placed mom and baby in football position on left side. After several minutes/attempts, was able to show her how to get baby on deeply in good latch. Reviewed signs of good latch and alignment, lip flange. Baby fed well for 20 minutes, did need some stimulation to keep actively sucking. No c/o pain. Mom burped infant and offered other side, but infant sleepy and content. Encouraged mom to put to breast often and offered if baby not feeding well consistently even by later today, to teach her how to pump and give back expressed breast milk, due to baby at higher risk of developing jaundice.

## 2019-02-19 NOTE — PROGRESS NOTES
Call placed to CRNA.  Dr. Searcy Leyden arrived to room to dose prior to OR. EFM off for transfer to OR

## 2019-02-19 NOTE — PROGRESS NOTES
IV hepwelled. SCD's discontinued. Restrepo removed. Patient tolerated procedure well. Assisted patient to bathroom and josh-care taught using josh-bottle. Patient verbalized understanding. Assisted patient to shower using Chlorexidine. Assisted patient back to bed with instructed to hydrate well. Patient verbalized understanding.

## 2019-02-19 NOTE — PROGRESS NOTES
Dr. Serena Trujillo at bedside. SVE performed. No change noted. Pitocing stopped at this time. C/s called.

## 2019-02-19 NOTE — PROGRESS NOTES
Anesthesiology  Post-op Note Post-op day 1 s/p  via epidural with neuraxial opioids for post-op pain management. Visit Vitals /64 (BP 1 Location: Right arm, BP Patient Position: At rest) Pulse 62 Temp 36.9 °C (98.4 °F) Resp 18 LMP 2018 (Exact Date) SpO2 96% Breastfeeding? Yes Airway patent, patient appropriately hydrated and appears euvolemic. Patient is Alert and oriented. Pain is well controlled. Pruritus is moderately controlled. Nausea is absent. No complaints about back or site of injection. Motor and sensory function has returned to baseline in lower extremities. Patient is satisfied with anesthetic and reports no complications. Continue current orders, then initiate surgeon's orders for pain management 24 hours after . Follow up per surgeon.

## 2019-02-19 NOTE — LACTATION NOTE
This note was copied from a baby's chart. In to teach mom how to use hospital grade pump. Infant went to [facility] for tachypnea. Full instruction given on how to use pump and clean pump parts. Mom pumped for first time and got 3mls of thick, yellow colostrum. Showed parents how to draw up in to syringe and label for SCN to scan and use for infant. Reviewed breast milk good for 6 hrs out on counter, but to bring to pump as soon after pumping as possible. Encouraged to pump q 3hrs, (at least 8x per day) and normal pumping volumes for first few days of life. Mom has no further questions or needs.  Lactation to follow up in am.

## 2019-02-19 NOTE — PROGRESS NOTES
Assessment complete per Doc Flowsheet. WNL.  Tylenol given po for pain 1/10 per patient request.

## 2019-02-19 NOTE — LACTATION NOTE
This note was copied from a baby's chart. Was called back to room per mom, wanting assistance w/ breast feeding. Baby asleep in bassinet but had been several hours since fed so mom wanted to wake baby up. Got baby skin to skin with mom. Attempted in cross cradle hold but mom uncomfortable in this position. Switched to football hold. Had to repeat directions to mom several times about where to put hand to hold baby for support, but with guidance and hands on top of moms, was able to help her continue to get concept of guiding baby on deeply. He fed well for about 12 of the 15 minutes on breast. No c/o pain. Infant relaxed during feeding. At times he had to stop and work on releasing congestion in nose before resuming. When baby came off, burped and offered other breast. Infant sleepy and content, no interest. Encouraged mom to keep trying baby q2-3 hrs, if baby not feeding consistently well each time by 24 hrs, told her to ask to pump as well behind feeding attempts/fair feeding and give back expressed colostrum. Mom verbalized understanding and declined offer to show her pump at this time.

## 2019-02-19 NOTE — PROGRESS NOTES
Labor Progress Note Patient seen, fetal heart rate and contraction pattern evaluated, patient examined. Patient Vitals for the past 1 hrs: 
 BP Pulse 02/18/19 1848 124/85 75  
02/18/19 1835 129/87 91  
02/18/19 1818 126/83 81 Physical Exam: 
Cervical Exam:  9.5 cm dilated 100% effaced +1 station Membranes:  Premature Rupture of Membranes; Amniotic Fluid: clear fluid Uterine Activity: Intensity: mild Fetal Heart Rate: Reactive Assessment/Plan: 
Reassuring fetal status, Labor  Not progressing normally  start pitocin augmentation Was stopped once having late decels, and decrease variability, now with good +FFS, will resume the pitocin.

## 2019-02-19 NOTE — LACTATION NOTE

## 2019-02-19 NOTE — PROGRESS NOTES
Labor Progress Note Patient seen, fetal heart rate and contraction pattern evaluated, patient examined. Patient Vitals for the past 1 hrs: 
 Temp  
19 2048 98.9 °F (37.2 °C) Physical Exam: 
Cervical Exam:  9 cm dilated 100% effaced +1 station Membranes:  Premature Rupture of Membranes; Amniotic Fluid: clear fluid Uterine Activity: Intensity: strong Fetal Heart Rate: Reactive Assessment/Plan: 
Reassuring fetal status, Proceed with  Section Reassuring fetal status with labor not progressing normally, findings consistent with failure of dilatation. Now having remained for over 4.5 hours ar 9 cm   Recommended proceeding with  delivery. Risks of bleeding, infection, bladder and bowel damage explained to patient,  and patient's family. They understand the situation and consent to the  delivery.

## 2019-02-20 PROCEDURE — 74011250637 HC RX REV CODE- 250/637: Performed by: OBSTETRICS & GYNECOLOGY

## 2019-02-20 PROCEDURE — 65270000029 HC RM PRIVATE

## 2019-02-20 RX ADMIN — KETOROLAC TROMETHAMINE 10 MG: 10 TABLET, FILM COATED ORAL at 06:26

## 2019-02-20 RX ADMIN — KETOROLAC TROMETHAMINE 10 MG: 10 TABLET, FILM COATED ORAL at 19:17

## 2019-02-20 RX ADMIN — SIMETHICONE CHEW TAB 80 MG 80 MG: 80 TABLET ORAL at 19:18

## 2019-02-20 RX ADMIN — KETOROLAC TROMETHAMINE 10 MG: 10 TABLET, FILM COATED ORAL at 12:44

## 2019-02-20 RX ADMIN — SIMETHICONE CHEW TAB 80 MG 80 MG: 80 TABLET ORAL at 10:14

## 2019-02-20 RX ADMIN — ZOLPIDEM TARTRATE 5 MG: 5 TABLET ORAL at 23:11

## 2019-02-20 RX ADMIN — DOCUSATE SODIUM 100 MG: 100 CAPSULE ORAL at 10:14

## 2019-02-20 RX ADMIN — DOCUSATE SODIUM 100 MG: 100 CAPSULE ORAL at 19:18

## 2019-02-20 RX ADMIN — SIMETHICONE CHEW TAB 80 MG 80 MG: 80 TABLET ORAL at 12:43

## 2019-02-20 NOTE — PROGRESS NOTES
Chart reviewed - first time parent, history of anxiety, baby admitted to the NICU.  provided education and pamphlet on [facility] [place] Home Visit Program.  Family was undecided on need for home visit. No referral will be made at this time. Family has this 's contact information should they decide to participate in program.   
 
Patient/ state that they are coping \"as well as can be expected\" regarding baby's admission to the NICU. They feel that they have been well informed and educated on baby's condition. Patient confirms history of anxiety (no depression). She states, \"I'm not ashamed of it, and I have no problem reaching out for help. \"  Patient states that she experienced an increase in anxiety during pregnancy as she \"has never carried a human before. \"  Patient reports that increase in anxiety was directly corelated to being pregnant.  provided informational packet on  mood disorder education/resources. Family receptive to receiving information and denied any additional needs from . Family has this 's contact information should any needs/questions arise. Nishant Garner [address]

## 2019-02-20 NOTE — PROGRESS NOTES
Patient called RN to room. Denies need for pain med. Patient had RN evaluate bloated abdomen, incision, possible blood clots in legs, and vaginal bleeding. Bowel sounds active, fundus firm and 1 below umbilicus at midline, incision approximated without redness, swelling, or drainage at site, scant amount of rubra lochia,  Bilateral lower legs without redness, tenderness or warmth. Bilateral swelling of feet with 1+ edema, non pitting. Patient reassured.

## 2019-02-20 NOTE — LACTATION NOTE
This note was copied from a baby's chart. In to see mom. Mom did skin to skin with infant this morning. She slept last night without pumping due to needing medication for panic attack. Mom states she is feeling better this am but anticipates she may have another one tonight. .. Reviewed importance as much as is possible, to pump 8 times per day to help encourage full milk supply. She feels some fullness starting in her right breast. Encouraged her to use compression w/ pumping to help relieve. Mom aware if baby stable and showing interest/feeding cues she can put baby to breast to try to latch and feed, but if mom is feeling anxious and does not feel ready or baby not feeling well, she can do skin to skin/breastplay. Mom getting several mls of colostrum each time she pumps. No needs or questions at this time.

## 2019-02-20 NOTE — PROGRESS NOTES
Post-Operative Day Number 2 Progress Note Patient doing well post-op day 2 from  delivery without significant complaints. Pain controlled on current medication. Voiding without difficulty, normal lochia. Vitals:   
Patient Vitals for the past 8 hrs: 
 BP Temp Pulse Resp SpO2  
19 0625 114/78 97.9 °F (36.6 °C) 83 16 97 % Temp (24hrs), Av.1 °F (36.7 °C), Min:97.5 °F (36.4 °C), Max:98.4 °F (36.9 °C) Vital signs stable, afebrile. Exam:  Patient without distress. Abdomen soft, fundus firm at level of umbilicus, non tender. Incision dry and clean without erythema. Lower extremities are negative for swelling, cords or tenderness. Lab/Data Review: All lab results for the last 24 hours reviewed. Assessment and Plan:  Patient appears to be having uncomplicated post- course. Continue routine post-op care and maternal education.

## 2019-02-20 NOTE — PROGRESS NOTES
Dr. Aaron Gongora notified of pt request for \"something to help me with the stress and to help me sleep. \" Orders received for 5mg Ambien. Orders verified with readback.

## 2019-02-21 PROCEDURE — 65270000029 HC RM PRIVATE

## 2019-02-21 PROCEDURE — 74011250637 HC RX REV CODE- 250/637: Performed by: OBSTETRICS & GYNECOLOGY

## 2019-02-21 RX ADMIN — KETOROLAC TROMETHAMINE 10 MG: 10 TABLET, FILM COATED ORAL at 06:10

## 2019-02-21 RX ADMIN — DOCUSATE SODIUM 100 MG: 100 CAPSULE ORAL at 18:33

## 2019-02-21 RX ADMIN — SIMETHICONE CHEW TAB 80 MG 80 MG: 80 TABLET ORAL at 12:32

## 2019-02-21 RX ADMIN — SIMETHICONE CHEW TAB 80 MG 80 MG: 80 TABLET ORAL at 20:35

## 2019-02-21 RX ADMIN — DOCUSATE SODIUM 100 MG: 100 CAPSULE ORAL at 12:32

## 2019-02-21 RX ADMIN — KETOROLAC TROMETHAMINE 10 MG: 10 TABLET, FILM COATED ORAL at 12:15

## 2019-02-21 RX ADMIN — SIMETHICONE CHEW TAB 80 MG 80 MG: 80 TABLET ORAL at 18:33

## 2019-02-21 RX ADMIN — KETOROLAC TROMETHAMINE 10 MG: 10 TABLET, FILM COATED ORAL at 18:33

## 2019-02-21 RX ADMIN — KETOROLAC TROMETHAMINE 10 MG: 10 TABLET, FILM COATED ORAL at 00:50

## 2019-02-21 NOTE — LACTATION NOTE
Mom called out for pump assistance. Had completed pumping but unsure how to collect milk independently has  as assisted at all other times. Showed mom how to take pump off her breasts and then take tubing from back of pump. Mom pumped 32ml. Poured pumped milk into volufeeder  for SCN, and reviewed labeling. Questions answered about milk storage for home once infant discharged. No other needs at present.

## 2019-02-21 NOTE — LACTATION NOTE
This note was copied from a baby's chart. Met mom in the nursery at 1200 to assist with a feeding. Infant very sleepy and not showing cues. Assisted mom with attempting to latch infant on her right breast in the cross cradle hold. Infant would place his mouth on mom's breast but not suck. Introduced a nipple shield (16mm) and showed mom how to use it. Attempted latch again and infant latched and sucked rhythmically for 12 minutes and came off content. Mom burped infant and placed him to her left breast in football hold. Again infant would not latch. Applied nippleshield and infant latched but did not suck. Infant very sleepy at this feeding attempt. Mom to go back to her room and pump.

## 2019-02-21 NOTE — PROGRESS NOTES
Post-Operative Day Number 3 Progress Note Patient doing well post-op day 3 from  delivery without significant complaints. Pain controlled on current medication. Voiding without difficulty, normal lochia. Infant still in SCN. Hoping to be discharged tomorrow. Vitals:   
Patient Vitals for the past 8 hrs: 
 BP Temp Pulse Resp SpO2  
19 0504 110/80 97.9 °F (36.6 °C) 78 18 96 % 19 0049 122/88 97.9 °F (36.6 °C) 65 16  Temp (24hrs), Av.8 °F (36.6 °C), Min:97.4 °F (36.3 °C), Max:98.1 °F (36.7 °C) Vital signs stable, afebrile. Exam:  Patient without distress. Abdomen soft, fundus firm at level of umbilicus, non tender. Incision dry and clean without erythema. Lower extremities are negative for swelling, cords or tenderness. Lab/Data Review: All lab results for the last 24 hours reviewed. Assessment and Plan:  Patient appears to be having uncomplicated post- course. Continue routine post-op care and maternal education. Infant in SCN and early, small, mom breast feeeding first time mom with high anxiety, needs an extra night of nursing care.   Plan home in am

## 2019-02-21 NOTE — PROGRESS NOTES
Pt relative approaches nurse desk and states patient is having \"shortness of breath. \" RN to patient room. Pt is sitting in bed with spouse at bedside and in no visible distress. Pt respiratory rate 16 and respirations even and unlabored. VS WNL. Pt anxious and verbalizes concern related to swelling and possibility of blood clots in legs. Pt assessed. No swelling, tenderness, or redness in lower extremities. 1+ non pitting edema in lower extremities, pt encouraged to increase PO fluids and continue ambulating as able. Intake and output flowsheet provided. Pt given pm meds (see MAR) and reassured. Pt encouraged to call for needs or concerns and she verbalizes understanding.

## 2019-02-22 VITALS
HEART RATE: 79 BPM | SYSTOLIC BLOOD PRESSURE: 125 MMHG | TEMPERATURE: 98 F | DIASTOLIC BLOOD PRESSURE: 88 MMHG | OXYGEN SATURATION: 95 % | RESPIRATION RATE: 18 BRPM

## 2019-02-22 PROCEDURE — 74011250637 HC RX REV CODE- 250/637: Performed by: OBSTETRICS & GYNECOLOGY

## 2019-02-22 RX ORDER — OXYCODONE AND ACETAMINOPHEN 7.5; 325 MG/1; MG/1
1-2 TABLET ORAL
Qty: 30 TAB | Refills: 0 | Status: SHIPPED | OUTPATIENT
Start: 2019-02-22

## 2019-02-22 RX ORDER — IBUPROFEN 600 MG/1
600 TABLET ORAL
Qty: 20 TAB | Refills: 1 | Status: SHIPPED | OUTPATIENT
Start: 2019-02-22

## 2019-02-22 RX ORDER — ESCITALOPRAM OXALATE 10 MG/1
10 TABLET ORAL DAILY
Qty: 30 TAB | Refills: 3 | Status: SHIPPED | OUTPATIENT
Start: 2019-02-22

## 2019-02-22 RX ORDER — LORAZEPAM 1 MG/1
1 TABLET ORAL
Qty: 20 TAB | Refills: 0 | Status: SHIPPED | OUTPATIENT
Start: 2019-02-22

## 2019-02-22 RX ADMIN — SIMETHICONE CHEW TAB 80 MG 80 MG: 80 TABLET ORAL at 10:07

## 2019-02-22 RX ADMIN — ZOLPIDEM TARTRATE 5 MG: 5 TABLET ORAL at 00:08

## 2019-02-22 RX ADMIN — KETOROLAC TROMETHAMINE 10 MG: 10 TABLET, FILM COATED ORAL at 00:08

## 2019-02-22 RX ADMIN — KETOROLAC TROMETHAMINE 10 MG: 10 TABLET, FILM COATED ORAL at 13:24

## 2019-02-22 RX ADMIN — SIMETHICONE CHEW TAB 80 MG 80 MG: 80 TABLET ORAL at 13:24

## 2019-02-22 RX ADMIN — DOCUSATE SODIUM 100 MG: 100 CAPSULE ORAL at 10:07

## 2019-02-22 RX ADMIN — KETOROLAC TROMETHAMINE 10 MG: 10 TABLET, FILM COATED ORAL at 06:21

## 2019-02-22 NOTE — PROGRESS NOTES
received phone call from DEVIN Kim, [phone]) asking that I follow-up with his wife. He expressed concern about a possible increase in anxiety.  agreeable.  met with patient who states, \"I don't have any concerns about my ability to take care of a , I'm just anxious about my health. \"  Patient reiterated that she's always been anxious, but now that she has a person to take care of, she is particularly anxious about her physical wellbeing. This may have been exacerbated by her most recent blood pressure reading. Emotional support provided by . OB prescribed Lexapro for patient today, but she doesn't feel the need to start this medication. Per patient, \"I've been on medicine before for years, and I know when I need it. \"  Patient states that her mother and  will provide feedback regarding her mood/emotional wellbeing if needed.  provided education/pamphlet on support available through local therapy practice ([facility]). EPDS = 8  inquired about [facility]  Home Visit. Family would like to receive this visit. Referral will be made at discharge. Patient denied any additional needs from . Patient has this 's phone # should any needs/questions arise. Phone call to DEVIN Kim, [phone]). No answer- message left informing DEVIN that I spoke with his wife and provided additional education/resources. Дмитрий Form, Conniee [place] [address]

## 2019-02-22 NOTE — DISCHARGE SUMMARY
Obstetrical Discharge Summary     Name: Juan Antonio Joya MRN: [id number]  SSN: [id number]    YOB: 1990  Age: 29 y.o. Sex: female      Allergies: Patient has no known allergies. Admit Date: 2019    Discharge Date: 2019     Admitting Physician: Tacos Vilchis MD     Attending Physician:  Izabel De La Cruz MD     * Admission Diagnoses: 37 weeks gestation of pregnancy [Z3A.37]  Gestational HTN, third trimester [O13.3]  Encounter for planned induction of labor [Z34.90]    * Discharge Diagnoses:   Information for the patient's :  Mala Queen [[id number]]   Delivery of a 2.655 kg male infant via , Low Transverse on 2019 at 10:28 PM  by . Apgars were 9 and 9. Additional Diagnoses:   Hospital Problems as of 2019 Date Reviewed: 2019          Codes Class Noted - Resolved POA    37 weeks gestation of pregnancy ICD-10-CM: Z3A.37  ICD-9-CM: V22.2  2019 - Present Unknown        Gestational HTN, third trimester ICD-10-CM: O13.3  ICD-9-CM: 642.33  2019 - Present Unknown        Encounter for planned induction of labor ICD-10-CM: Z34.90  ICD-9-CM: V22.1  2019 - Present Unknown        * (Principal) Full-term premature rupture of membranes ICD-10-CM: O42.92  ICD-9-CM: 658.10  2019 - Present Unknown             Lab Results   Component Value Date/Time    ABO/Rh(D) O POSITIVE 2019 04:55 AM    Rubella, External immune 2018    GrBStrep, External Negative 2019    ABO,Rh O positive 2018    There is no immunization history for the selected administration types on file for this patient. * Procedures:   Procedure(s):   SECTION           * Discharge Condition: good and stable    * Hospital Course: Postpartum course was complicated by infant in SCN, maternal anxiety, which added 1 days to the patient's length of stay.       * Disposition: Home    Discharge Medications:   Current Discharge Medication List      START taking these medications    Details   oxyCODONE-acetaminophen (PERCOCET 7.5) 7.5-325 mg per tablet Take 1-2 Tabs by mouth every six (6) hours as needed. Max Daily Amount: 8 Tabs. Qty: 30 Tab, Refills: 0    Associated Diagnoses: Delivery of pregnancy by  section      escitalopram oxalate (LEXAPRO) 10 mg tablet Take 1 Tab by mouth daily. Qty: 30 Tab, Refills: 3    Comments: Start with 1/2 po daily for first week, then 1 po daily if needed  Associated Diagnoses: DENISE (generalized anxiety disorder)      ibuprofen (MOTRIN) 600 mg tablet Take 1 Tab by mouth every six (6) hours as needed for Pain. Qty: 20 Tab, Refills: 1    Associated Diagnoses: Delivery of pregnancy by  section      LORazepam (ATIVAN) 1 mg tablet Take 1 Tab by mouth daily as needed for Anxiety. Qty: 20 Tab, Refills: 0    Comments: 1/2 to 1 po daily for onset of panic attack  Associated Diagnoses: DENISE (generalized anxiety disorder)             * Follow-up Care/Patient Instructions: Activity: Activity as tolerated  Diet: Regular Diet  Wound Care: As directed    Follow-up Information     Follow up With Specialties Details [facility], [address]  [phone]             Signed By:  Burton Jay MD     2019                                                Post-Operative Day Number 3 Progress/Discharge Note    Patient doing well post-op day 3 from  delivery without significant complaints. Pain controlled on current medication. Voiding without difficulty, normal lochia. Vitals:    Patient Vitals for the past 8 hrs:   BP Temp Pulse Resp SpO2   19 0754 (!) 142/94 97.5 °F (36.4 °C) 66 18 96 %   19 0411 134/90 98.6 °F (37 °C) 81 18 95 %     Temp (24hrs), Av.8 °F (36.6 °C), Min:97.5 °F (36.4 °C), Max:98.6 °F (37 °C)      Vital signs stable, afebrile. Exam:  Patient without distress.                Abdomen soft, fundus firm at level of umbilicus, non tender. Incision dry and                      clean without erythema. Lower extremities are negative for swelling, cords or tenderness. Lab/Data Review: All lab results for the last 24 hours reviewed. Assessment and Plan:  Patient appears to be having uncomplicated post- course. Continue routine post-op care and maternal education. Plan discharge for today with follow up in our office in 1-2 weeks.

## 2019-02-22 NOTE — PROGRESS NOTES
Toradol 10 mg PO given to pt per request.  Educated pt to call out if pain medication does not help. Pt given scheduled gasx chewable.

## 2019-02-22 NOTE — PROGRESS NOTES
Dr. Petrona Omalley to inform him the MD had diastolic BP in the 63'Q. MD stated he was not concerned unless the diastolic is 093 or greater.

## 2019-02-22 NOTE — LACTATION NOTE
In to see mom prior to discharge to home. Infant discharging from the nursery as well. Reviewed discharge information with mom and grandmother to include engorgement. Mom stated that she  infant on one breast earlier this am. She pumped one breast just enough to soften her breast when she got back to the room. She stated that she plans to breast feed infant on one side just enough for him to go to sleep and then she plans to pump when she gets home and settled. Strongly encouraged her to pump both breasts after this feeding and every feeding to empty her breasts and keep her breasts empty. Informed her that sometimes it could take more than 15 minutes to empty and she may even have to pump more frequently until she works through the engorgement. Informed her that an empty breast is what signals a person's body to make more milk. Reviewed with her that infant needs to nurse at least 8 times in 24 hours and reviewed output as well as showed her on her breastfeeding packet. Mom and infant are following up with [facility] on Monday February 25th. Informed mom that she can call our outpatient lactation consultant as needed for questions and concerns as well.

## 2019-02-22 NOTE — PROGRESS NOTES
Patient discharged from Mother Infant room. Discharge teaching complete. Patient verbalizes understanding. Questions encouraged and answered. Pt taken to car via wheelchair. Stable at discharge.

## 2019-02-22 NOTE — DISCHARGE INSTRUCTIONS
Discharge instruction to follow: Activity: Pelvis rest for 6 weeks     No heavy lifting over 15 lbs for 2 weeks     No driving for 2 weeks     No push/pull motion such as sweeping or vacuuming for 2 weeks     No tub baths for 6 weeks     section keep incision clean and dry, may shower as normal with soap and water. Inspect incision every day for signs of infection listed below. Continue to use josh-bottle with every void or bowel movement until comfortable stopping. Change sanitary pad after each urination or bowel movement. Call MD for the following:      Fever over 101 F; pain not relieved by medication; foul smelling vaginal discharge or increase in vaginal bleeding. Redness, swelling, or drainage from  incision. Take medication as prescribed. Follow up with MD as order. DISCHARGE SUMMARY from Nurse    PATIENT INSTRUCTIONS:    After general anesthesia or intravenous sedation, for 24 hours or while taking prescription Narcotics:  · Limit your activities  · Do not drive and operate hazardous machinery  · Do not make important personal or business decisions  · Do  not drink alcoholic beverages  · If you have not urinated within 8 hours after discharge, please contact your surgeon on call. Report the following to your surgeon:  · Excessive pain, swelling, redness or odor of or around the surgical area  · Temperature over 100.5  · Nausea and vomiting lasting longer than 4 hours or if unable to take medications  · Any signs of decreased circulation or nerve impairment to extremity: change in color, persistent  numbness, tingling, coldness or increase pain  · Any questions    What to do at Home:  Recommended activity: Activity as tolerated        *  Please give a list of your current medications to your Primary Care Provider.     *  Please update this list whenever your medications are discontinued, doses are      changed, or new medications (including over-the-counter products) are added. *  Please carry medication information at all times in case of emergency situations. These are general instructions for a healthy lifestyle:    No smoking/ No tobacco products/ Avoid exposure to second hand smoke  Surgeon General's Warning:  Quitting smoking now greatly reduces serious risk to your health. Obesity, smoking, and sedentary lifestyle greatly increases your risk for illness    A healthy diet, regular physical exercise & weight monitoring are important for maintaining a healthy lifestyle    You may be retaining fluid if you have a history of heart failure or if you experience any of the following symptoms:  Weight gain of 3 pounds or more overnight or 5 pounds in a week, increased swelling in our hands or feet or shortness of breath while lying flat in bed. Please call your doctor as soon as you notice any of these symptoms; do not wait until your next office visit. Recognize signs and symptoms of STROKE:    F-face looks uneven    A-arms unable to move or move unevenly    S-speech slurred or non-existent    T-time-call 911 as soon as signs and symptoms begin-DO NOT go       Back to bed or wait to see if you get better-TIME IS BRAIN. Warning Signs of HEART ATTACK     Call 911 if you have these symptoms:   Chest discomfort. Most heart attacks involve discomfort in the center of the chest that lasts more than a few minutes, or that goes away and comes back. It can feel like uncomfortable pressure, squeezing, fullness, or pain.  Discomfort in other areas of the upper body. Symptoms can include pain or discomfort in one or both arms, the back, neck, jaw, or stomach.  Shortness of breath with or without chest discomfort.  Other signs may include breaking out in a cold sweat, nausea, or lightheadedness. Don't wait more than five minutes to call 911 - MINUTES MATTER! Fast action can save your life. Calling 911 is almost always the fastest way to get lifesaving treatment. Emergency Medical Services staff can begin treatment when they arrive -- up to an hour sooner than if someone gets to the hospital by car. The discharge information has been reviewed with the patient. The patient verbalized understanding. Discharge medications reviewed with the patient and appropriate educational materials and side effects teaching were provided. ___________________________________________________________________________________________________________________________________      Section: What to Expect at Home  Your Recovery    A  section, or , is surgery to deliver your baby through a cut, called an incision, that the doctor makes in your lower belly and uterus. You may have some pain in your lower belly and need pain medicine for 1 to 2 weeks. You can expect some vaginal bleeding for several weeks. You will probably need about 6 weeks to fully recover. It is important to take it easy while the incision is healing. Avoid heavy lifting, strenuous activities, or exercises that strain the belly muscles while you are recovering. Ask a family member or friend for help with housework, cooking, and shopping. This care sheet gives you a general idea about how long it will take for you to recover. But each person recovers at a different pace. Follow the steps below to get better as quickly as possible. How can you care for yourself at home? Activity    · Rest when you feel tired. Getting enough sleep will help you recover.     · Try to walk each day. Start by walking a little more than you did the day before. Bit by bit, increase the amount you walk.  Walking boosts blood flow and helps prevent pneumonia, constipation, and blood clots.     · Avoid strenuous activities, such as bicycle riding, jogging, weightlifting, and aerobic exercise, for 6 weeks or until your doctor says it is okay.     · Until your doctor says it is okay, do not lift anything heavier than your baby.     · Do not do sit-ups or other exercises that strain the belly muscles for 6 weeks or until your doctor says it is okay.     · Hold a pillow over your incision when you cough or take deep breaths. This will support your belly and decrease your pain.     · You may shower as usual. Pat the incision dry when you are done.     · You will have some vaginal bleeding. Wear sanitary pads. Do not douche or use tampons until your doctor says it is okay.     · Ask your doctor when you can drive again.     · You will probably need to take at least 6 weeks off work. It depends on the type of work you do and how you feel.     · Ask your doctor when it is okay for you to have sex. Diet    · You can eat your normal diet. If your stomach is upset, try bland, low-fat foods like plain rice, broiled chicken, toast, and yogurt.     · Drink plenty of fluids (unless your doctor tells you not to).     · You may notice that your bowel movements are not regular right after your surgery. This is common. Try to avoid constipation and straining with bowel movements. You may want to take a fiber supplement every day. If you have not had a bowel movement after a couple of days, ask your doctor about taking a mild laxative.     · If you are breastfeeding, limit alcohol. Alcohol can cause a lack of energy and other health problems for the baby when a breastfeeding woman drinks heavily. It can also get in the way of a mom's ability to feed her baby or to care for the child in other ways. There isn't a lot of research about exactly how much alcohol can harm a baby. Having no alcohol is the safest choice for your baby. If you choose to have a drink now and then, have only one drink, and limit the number of occasions that you have a drink. Wait to breastfeed at least 2 hours after you have a drink to reduce the amount of alcohol the baby may get in the milk.    Medicines    · Your doctor will tell you if and when you can restart your medicines. He or she will also give you instructions about taking any new medicines.     · If you take blood thinners, such as warfarin (Coumadin), clopidogrel (Plavix), or aspirin, be sure to talk to your doctor. He or she will tell you if and when to start taking those medicines again. Make sure that you understand exactly what your doctor wants you to do.     · Take pain medicines exactly as directed. ? If the doctor gave you a prescription medicine for pain, take it as prescribed. ? If you are not taking a prescription pain medicine, ask your doctor if you can take an over-the-counter medicine.     · If you think your pain medicine is making you sick to your stomach:  ? Take your medicine after meals (unless your doctor has told you not to). ? Ask your doctor for a different pain medicine.     · If your doctor prescribed antibiotics, take them as directed. Do not stop taking them just because you feel better. You need to take the full course of antibiotics. Incision care    · If you have strips of tape on the incision, leave the tape on for a week or until it falls off.     · Wash the area daily with warm, soapy water, and pat it dry. Don't use hydrogen peroxide or alcohol, which can slow healing. You may cover the area with a gauze bandage if it weeps or rubs against clothing. Change the bandage every day.     · Keep the area clean and dry. Other instructions    · If you breastfeed your baby, you may be more comfortable while you are healing if you place the baby so that he or she is not resting on your belly. Try tucking your baby under your arm, with his or her body along the side you will be feeding on. Support your baby's upper body with your arm. With that hand you can control your baby's head to bring his or her mouth to your breast. This is sometimes called the football hold. Follow-up care is a key part of your treatment and safety.  Be sure to make and go to all appointments, and call your doctor if you are having problems. It's also a good idea to know your test results and keep a list of the medicines you take. When should you call for help? Call 911 anytime you think you may need emergency care. For example, call if:    · You passed out (lost consciousness).     · You have chest pain, are short of breath, or cough up blood.    Call your doctor now or seek immediate medical care if:    · You have pain that does not get better after you take pain medicine.     · You have severe vaginal bleeding.     · You are dizzy or lightheaded, or you feel like you may faint.     · You have new or worse pain in your belly or pelvis.     · You have loose stitches, or your incision comes open.     · You have symptoms of infection, such as:  ? Increased pain, swelling, warmth, or redness. ? Red streaks leading from the incision. ? Pus draining from the incision. ? A fever.     · You have symptoms of a blood clot in your leg (called a deep vein thrombosis), such as:  ? Pain in your calf, back of the knee, thigh, or groin. ? Redness and swelling in your leg or groin.    Watch closely for changes in your health, and be sure to contact your doctor if:    · You do not get better as expected. Where can you learn more? Go to http://sha-jerardo.info/. Enter M806 in the search box to learn more about \" Section: What to Expect at Home. \"  Current as of: 2018  Content Version: 11.9  © 4547-5017 [employer]. Care instructions adapted under license by [employer] (which disclaims liability or warranty for this information). If you have questions about a medical condition or this instruction, always ask your healthcare professional. [address] any warranty or liability for your use of this information.

## 2019-02-22 NOTE — PROGRESS NOTES
Pt requested to have another fundal check prior to discharge. Fundus is firm and located 1 below the umbilicus. Patient is experiencing scant bleeding.

## 2019-02-28 ENCOUNTER — HOSPITAL ENCOUNTER (EMERGENCY)
Age: 29
Discharge: HOME | End: 2019-02-28
Attending: EMERGENCY MEDICINE
Payer: COMMERCIAL

## 2019-02-28 ENCOUNTER — APPOINTMENT (OUTPATIENT)
Dept: CT IMAGING | Age: 29
End: 2019-02-28
Attending: EMERGENCY MEDICINE
Payer: COMMERCIAL

## 2019-02-28 VITALS
WEIGHT: 136 LBS | OXYGEN SATURATION: 98 % | SYSTOLIC BLOOD PRESSURE: 143 MMHG | HEIGHT: 62 IN | DIASTOLIC BLOOD PRESSURE: 84 MMHG | RESPIRATION RATE: 18 BRPM | HEART RATE: 81 BPM | BODY MASS INDEX: 25.03 KG/M2 | TEMPERATURE: 98.6 F

## 2019-02-28 DIAGNOSIS — R07.81 PLEURITIC CHEST PAIN: Primary | ICD-10-CM

## 2019-02-28 DIAGNOSIS — R06.02 SHORTNESS OF BREATH: ICD-10-CM

## 2019-02-28 LAB
ANION GAP SERPL CALC-SCNC: 8 MMOL/L
ATRIAL RATE: 88 BPM
BASOPHILS # BLD: 0 K/UL (ref 0–0.2)
BASOPHILS NFR BLD: 1 % (ref 0–2)
BUN SERPL-MCNC: 13 MG/DL (ref 6–23)
CALCIUM SERPL-MCNC: 8.2 MG/DL (ref 8.3–10.4)
CALCULATED P AXIS, ECG09: 80 DEGREES
CALCULATED R AXIS, ECG10: 77 DEGREES
CALCULATED T AXIS, ECG11: 64 DEGREES
CHLORIDE SERPL-SCNC: 112 MMOL/L (ref 98–107)
CO2 SERPL-SCNC: 23 MMOL/L (ref 21–32)
CREAT SERPL-MCNC: 0.82 MG/DL (ref 0.6–1)
DIAGNOSIS, 93000: NORMAL
DIFFERENTIAL METHOD BLD: NORMAL
EOSINOPHIL # BLD: 0.1 K/UL (ref 0–0.8)
EOSINOPHIL NFR BLD: 1 % (ref 0.5–7.8)
ERYTHROCYTE [DISTWIDTH] IN BLOOD BY AUTOMATED COUNT: 13.4 % (ref 11.9–14.6)
GLUCOSE SERPL-MCNC: 77 MG/DL (ref 65–100)
HCT VFR BLD AUTO: 38.7 % (ref 35.8–46.3)
HGB BLD-MCNC: 12.7 G/DL (ref 11.7–15.4)
IMM GRANULOCYTES # BLD AUTO: 0 K/UL (ref 0–0.5)
IMM GRANULOCYTES NFR BLD AUTO: 0 % (ref 0–5)
LYMPHOCYTES # BLD: 2.5 K/UL (ref 0.5–4.6)
LYMPHOCYTES NFR BLD: 30 % (ref 13–44)
MCH RBC QN AUTO: 29.9 PG (ref 26.1–32.9)
MCHC RBC AUTO-ENTMCNC: 32.8 G/DL (ref 31.4–35)
MCV RBC AUTO: 91.1 FL (ref 79.6–97.8)
MONOCYTES # BLD: 0.7 K/UL (ref 0.1–1.3)
MONOCYTES NFR BLD: 8 % (ref 4–12)
NEUTS SEG # BLD: 5 K/UL (ref 1.7–8.2)
NEUTS SEG NFR BLD: 60 % (ref 43–78)
NRBC # BLD: 0 K/UL (ref 0–0.2)
P-R INTERVAL, ECG05: 112 MS
PLATELET # BLD AUTO: 279 K/UL (ref 150–450)
PMV BLD AUTO: 10.5 FL (ref 9.4–12.3)
POTASSIUM SERPL-SCNC: 3.6 MMOL/L (ref 3.5–5.1)
Q-T INTERVAL, ECG07: 368 MS
QRS DURATION, ECG06: 74 MS
QTC CALCULATION (BEZET), ECG08: 445 MS
RBC # BLD AUTO: 4.25 M/UL (ref 4.05–5.2)
SODIUM SERPL-SCNC: 143 MMOL/L (ref 136–145)
TROPONIN I BLD-MCNC: 0 NG/ML (ref 0.02–0.05)
VENTRICULAR RATE, ECG03: 88 BPM
WBC # BLD AUTO: 8.3 K/UL (ref 4.3–11.1)

## 2019-02-28 PROCEDURE — 80048 BASIC METABOLIC PNL TOTAL CA: CPT

## 2019-02-28 PROCEDURE — 74011000258 HC RX REV CODE- 258: Performed by: EMERGENCY MEDICINE

## 2019-02-28 PROCEDURE — 84484 ASSAY OF TROPONIN QUANT: CPT

## 2019-02-28 PROCEDURE — 74011636320 HC RX REV CODE- 636/320: Performed by: EMERGENCY MEDICINE

## 2019-02-28 PROCEDURE — 99284 EMERGENCY DEPT VISIT MOD MDM: CPT | Performed by: EMERGENCY MEDICINE

## 2019-02-28 PROCEDURE — 93005 ELECTROCARDIOGRAM TRACING: CPT | Performed by: EMERGENCY MEDICINE

## 2019-02-28 PROCEDURE — 85025 COMPLETE CBC W/AUTO DIFF WBC: CPT

## 2019-02-28 PROCEDURE — 71260 CT THORAX DX C+: CPT

## 2019-02-28 RX ORDER — SODIUM CHLORIDE 0.9 % (FLUSH) 0.9 %
10 SYRINGE (ML) INJECTION
Status: COMPLETED | OUTPATIENT
Start: 2019-02-28 | End: 2019-02-28

## 2019-02-28 RX ADMIN — IOPAMIDOL 100 ML: 755 INJECTION, SOLUTION INTRAVENOUS at 16:32

## 2019-02-28 RX ADMIN — SODIUM CHLORIDE 100 ML: 900 INJECTION, SOLUTION INTRAVENOUS at 16:32

## 2019-02-28 RX ADMIN — Medication 10 ML: at 16:32

## 2019-02-28 NOTE — ED TRIAGE NOTES
Pt arrived to ED with chest pain. Pt states she started having chest pain yesterday that comes and goes. Pt had a c section on 2/18.  Pt denies n/v/d.

## 2019-02-28 NOTE — ED NOTES

## 2019-02-28 NOTE — ED PROVIDER NOTES
30-year-old  who is 10 days status post  for premature rupture of membranes presents with chest pain and shortness of breath. H and started feeling conversational dyspnea with slight exertional dyspnea yesterday. She was initially not sure if it was because of the weight of her milk engorged breasts pressing on her chest, or something else. Today she experienced some sharp left-sided pleuritic chest pain following a coughing spell. Pain reproduced when she sneezed or coughed, but not with every breath. Patient called the office who said she needed to come get checked out for possible pulmonary embolism. Minimal sporadic left proximal medial thigh pain which lasts only moments time is nothing sustained. Patient has minimal, and consistently decreasing leg edema. No history of DVT before. Past Medical History:  
Diagnosis Date  Abnormal Papanicolaou smear of cervix  Anemia  Dysmenorrhea   
 better on OCP  
 Hx of abnormal Pap smear 13 neg pap, +HPV neg 16 and18;2012 ASCUS pap,+HPV; 2011 neg pap, +HPV neg 16 and 18  Hypertension affecting pregnancy, third trimester 2019  Psychiatric problem  Traumatic injury during pregnancy, antepartum, third trimester 2019 Past Surgical History:  
Procedure Laterality Date  HX COLPOSCOPY  4/15/13  
 4/13 neg;3/12 CHRISTIE I endo and ecto;3/11 CHRISTIE I endo and ecto Family History:  
Problem Relation Age of Onset  Diabetes Paternal Aunt  Hypertension Maternal Grandmother  Diabetes Maternal Grandmother  No Known Problems Mother  No Known Problems Father  Diabetes Maternal Grandfather  No Known Problems Paternal Grandmother  No Known Problems Paternal Grandfather Social History Socioeconomic History  Marital status:  Spouse name: Heather Michelle  Number of children: 0  
 Years of education: masters  Highest education level: Master's degree (e.g., MA, MS, Amy, MEd, MSW, BERNADETTE) Social Needs  Financial resource strain: Not very hard  Food insecurity - worry: Never true  Food insecurity - inability: Never true  Transportation needs - medical: No  
 Transportation needs - non-medical: No  
Occupational History  Not on file Tobacco Use  Smoking status: Never Smoker  Smokeless tobacco: Never Used Substance and Sexual Activity  Alcohol use: Yes Comment: none with +UPT  Drug use: No  
 Sexual activity: Yes  
  Partners: Male Birth control/protection: None Other Topics Concern [address] Service Not Asked  Blood Transfusions Not Asked  Caffeine Concern Not Asked  Occupational Exposure Not Asked Martina Clas Hazards Not Asked  Sleep Concern Not Asked  Stress Concern Not Asked  Weight Concern Not Asked  Special Diet Not Asked  Back Care Not Asked  Exercise Not Asked  Bike Helmet Not Asked  Seat Belt Not Asked  Self-Exams Not Asked Social History Narrative  Not on file ALLERGIES: Patient has no known allergies. Review of Systems Constitutional: Negative for chills and fever. HENT: Negative for rhinorrhea and sore throat. Eyes: Negative for discharge and redness. Respiratory: Positive for shortness of breath. Negative for cough. Cardiovascular: Positive for chest pain and leg swelling. Negative for palpitations. Gastrointestinal: Negative for abdominal pain, diarrhea, nausea and vomiting. Musculoskeletal: Negative for arthralgias and back pain. Skin: Negative for rash. Neurological: Negative for dizziness and headaches. All other systems reviewed and are negative. Vitals:  
 02/28/19 1450 02/28/19 1502 BP: 135/89 Pulse: 84 Resp: 17 Temp: 98.6 °F (37 °C) SpO2: 96% 97% Weight: 61.7 kg (136 lb) Height: 5' 2\" (1.575 m) Physical Exam  
 Constitutional: She is oriented to person, place, and time. She appears well-developed and well-nourished. HENT:  
Head: Normocephalic and atraumatic. Eyes: Conjunctivae are normal. Pupils are equal, round, and reactive to light. Right eye exhibits no discharge. Left eye exhibits no discharge. No scleral icterus. Neck: Normal range of motion. Neck supple. Cardiovascular: Normal rate, regular rhythm and normal heart sounds. Exam reveals no gallop. No murmur heard. Pulmonary/Chest: Effort normal and breath sounds normal. No respiratory distress. She has no wheezes. She has no rales. Abdominal: Soft. Bowel sounds are normal. There is no tenderness. There is no guarding. Musculoskeletal: Normal range of motion. She exhibits no edema. Neurological: She is alert and oriented to person, place, and time. She exhibits normal muscle tone. cni 2-12 grossly Skin: Skin is warm and dry. She is not diaphoretic. Psychiatric: She has a normal mood and affect. Her behavior is normal.  
Nursing note and vitals reviewed. MDM Number of Diagnoses or Management Options Diagnosis management comments: Medical decision making note: 
Pleuritic left chest pain with conversational dyspnea and slight exertional dyspnea patient 10 days status post  Check CT scan to rule out PE This concludes the \"medical decision making note\" part of this emergency department visit note. Procedures

## 2019-03-01 NOTE — LACTATION NOTE
Down to ER per request. Mom in ER w/ c/o chest pain. Mom was told due to CT contrast, to pump and dump her breast milk for next 12-24 hrs. Then can resume giving it. Mom on board with waiting minimum time. Set her up w/ hospital grade pump- she pumped off several ounces. Mom baby has been back to Pediatrician and gaining good, appropriate weight. Baby having lots of wet/dirty diapers and content after feeds. Mom has an over supply and has been very uncomfortable and leaking a lot between feeds throughout the day. Discussed pumping to comfort after some of those and not all the way empty to help her body regulate if she does not wish to make that amount of extra breast milk. Mom states she already had a lot of extra breast milk stored in freezer and needs to buy a deep freezer. Mom has filled prescription for an anxiety medication, (ativan) but has not taken in yet. She states she feels she has not needed it yet. Reviewed the information from medications and mothers milk- L3 and discussed the risk/benefit. Per Mom, is not on any other medications that could compound w/ the sedation effects of that medication. Mom aware its okay to take as needed. She states as long as she is in her routing at home she feels good, going out in public w/ baby sometimes can give her anxiety. Encouraged her to call us as needed for any breast feeding support and to utlize her community as she transitions into mother rubio. Mom feels good at this time and wa able to ask all her questions. No further needs at this time. Mom will feed baby prior pumped milk pre contrast until it is out of her system.

## 2019-03-08 ENCOUNTER — HOSPITAL ENCOUNTER (OUTPATIENT)
Age: 29
Discharge: HOME | End: 2019-03-08
Attending: OBSTETRICS & GYNECOLOGY | Admitting: OBSTETRICS & GYNECOLOGY
Payer: COMMERCIAL

## 2019-03-08 VITALS
HEART RATE: 89 BPM | RESPIRATION RATE: 18 BRPM | SYSTOLIC BLOOD PRESSURE: 136 MMHG | OXYGEN SATURATION: 97 % | DIASTOLIC BLOOD PRESSURE: 86 MMHG | TEMPERATURE: 98.5 F

## 2019-03-08 PROBLEM — I10 HYPERTENSION: Status: ACTIVE | Noted: 2019-03-08

## 2019-03-08 PROCEDURE — 99282 EMERGENCY DEPT VISIT SF MDM: CPT

## 2019-03-08 NOTE — PROGRESS NOTES
Pt presented to [facility] complaining of increased BP. Pt 18 days postpartum. /86. Dr Christ Izaguirre notified. MD states to transfer pt to ER. Pt refused to go to ER and insisted to be seen by Hospitalist.  Pts BP was elevated at babies pediatrician appt. Pt denies HA, blurred vision or epigastric pain. Pt sates she had a HA earlier,  took Motrin and it went away. Dr Polo See in to see pt. Assessment complete. MD assessed pts swelling in right foot. Urine dip- Negative Protein                   Negative glucose                    1+ Ketones     Pt reassured. Pt ok to be discharged home. Pt to follow up with Dr Alka Akhtar next week. Discharge instructions reviewed. Pt verbalizes understanding.

## 2019-03-08 NOTE — DISCHARGE INSTRUCTIONS
Patient Education        Learning About Preeclampsia After Childbirth  What is preeclampsia? A woman with preeclampsia has blood pressure that is higher than usual. She may also have other serious symptoms. Preeclampsia can be dangerous. When it is severe, it can cause seizures (eclampsia) or liver or kidney damage. When the liver is affected, some women get HELLP syndrome, a blood-clotting and bleeding problem. HELLP can come on quickly and can be deadly. This is why your doctor checks you and your baby often. Preeclampsia usually occurs after 20 weeks of pregnancy. Most often, it starts near the end of pregnancy and goes away after childbirth. But symptoms may last a few weeks or more and can get worse after delivery. Rarely, symptoms of preeclampsia don't show up until days or even weeks after childbirth. What are the symptoms? Mild preeclampsia usually doesn't cause symptoms. But preeclampsia can cause rapid weight gain and sudden swelling of the hands and face. Severe preeclampsia does cause symptoms. It can cause a very bad headache and trouble seeing and breathing. It also can cause belly pain. You may also urinate less than usual.  If you have new preeclampsia symptoms after you go home from the hospital, call your doctor right away. What can you expect after you have had preeclampsia? In the hospital  After the baby and the placenta are delivered, preeclampsia usually starts to improve. Most women get better in the first few days after childbirth. After having preeclampsia, you still have a risk of seizures for a day or more after childbirth. (Very rarely, seizures happen later on.) So your doctor may have you take magnesium sulfate for a day or more to prevent seizures. You may also take medicine to lower your blood pressure. When you go home  Your blood pressure will most likely return to normal a few days after delivery.  Your doctor will want to check your blood pressure sometime in the first week after you leave the hospital.  Some women still have high blood pressure 6 weeks after childbirth. But most return to normal levels over the long term. · Take and record your blood pressure at home if your doctor tells you to. ? Learn the importance of the two measures of blood pressure (such as 120 over 80, or 120/80). The first number is the systolic pressure. This is the force of blood on the artery walls as the heart pumps. The second number is the diastolic pressure. This is the force of blood on the artery walls between heartbeats, when the heart is at rest. You have a choice of monitors to use. § Manual monitor: You pump up the cuff and use a stethoscope to listen for your pulse. § Electronic monitor: The cuff inflates, and a gauge shows your pulse rate. ? To take your blood pressure:  § Ask your doctor to check your blood pressure monitor to be sure that it is accurate and that the cuff fits you. Also ask your doctor to watch you use it, to make sure that you are using it right. § You should not eat, use tobacco products, or use medicine known to raise blood pressure (such as some nasal decongestant sprays) before you take your blood pressure. § Avoid taking your blood pressure if you have just exercised or are nervous or upset. Rest at least 15 minutes before you take your blood pressure. · Be safe with medicines. If you take medicine, take it exactly as prescribed. Call your doctor if you think you are having a problem with your medicine. · Do not smoke. Quitting smoking will help lower your blood pressure and improve your baby's growth and health. If you need help quitting, talk to your doctor about stop-smoking programs and medicines. These can increase your chances of quitting for good. · Eat a balanced and healthy diet that has lots of fruits and vegetables.   Long-term health  After you have had preeclampsia, you have a higher-than-average risk of heart disease, stroke, and kidney disease. This may be because the same things that cause preeclampsia also cause heart and kidney disease. To protect your health, work with your doctor on living a heart-healthy lifestyle and getting the checkups you need. Your doctor may also want you to check your blood pressure at home. Follow-up care is a key part of your treatment and safety. Be sure to make and go to all appointments, and call your doctor if you are having problems. It's also a good idea to know your test results and keep a list of the medicines you take. Where can you learn more? Go to http://sha-jerardo.info/. Enter B789 in the search box to learn more about \"Learning About Preeclampsia After Childbirth. \"  Current as of: September 5, 2018  Content Version: 11.9  © 6851-4797 [employer]. Care instructions adapted under license by [employer] (which disclaims liability or warranty for this information). If you have questions about a medical condition or this instruction, always ask your healthcare professional. [address] any warranty or liability for your use of this information.

## 2019-03-08 NOTE — ED PROVIDER NOTES
Chief Complaint: elevated BP      29 y.o. female  who is 18 days postpartum s/p C section. Pt was in her pediatrician's office today and her BP was noted to be elevated. She was asked to come to the  [facility] for evaluation. Pt is breastfeeding. She denies VB, fevers, UTI or PEC symptoms. Pt does note the swelling of her right ankle is slightly worse than her left. Pt had a pulmonary CT last week that was negative for PE. HISTORY:    Social History     Substance and Sexual Activity   Sexual Activity Yes    Partners: Male    Birth control/protection: None     Patient's last menstrual period was 2018 (exact date).     Social History     Socioeconomic History    Marital status:      Spouse name: Lissette Perea    Number of children: 0    Years of education: masters    Highest education level: Master's degree (e.g., MA, MS, Amy, MEd, MSW, [employer])   Social Needs    Financial resource strain: Not very hard    Food insecurity - worry: Never true    Food insecurity - inability: Never true   [employer] needs - medical: No    Transportation needs - non-medical: No   Occupational History    Not on file   Tobacco Use    Smoking status: Never Smoker    Smokeless tobacco: Never Used   Substance and Sexual Activity    Alcohol use: Yes     Comment: none with +UPT    Drug use: No    Sexual activity: Yes     Partners: Male     Birth control/protection: None   Other Topics Concern     Service Not Asked    Blood Transfusions Not Asked    Caffeine Concern Not Asked    Occupational Exposure Not Asked    Hobby Hazards Not Asked    Sleep Concern Not Asked    Stress Concern Not Asked    Weight Concern Not Asked    Special Diet Not Asked    Back Care Not Asked    Exercise Not Asked    Bike Helmet Not Asked   [place] Not Asked    Self-Exams Not Asked   Social History Narrative    Not on file       Past Surgical History:   Procedure Laterality Date    HX COLPOSCOPY  4/15/13 4/13 neg;3/12 [facility] I endo and ecto;3/11 [facility] I endo and ecto       Past Medical History:   Diagnosis Date    Abnormal Papanicolaou smear of cervix     Anemia     Dysmenorrhea     better on OCP    Hx of abnormal Pap smear 2/27/13 2/13 neg pap, +HPV neg 16 and18;2/2012 ASCUS pap,+HPV; 2/2011 neg pap, +HPV neg 16 and 18    Hypertension affecting pregnancy, third trimester 1/19/2019    Psychiatric problem     Traumatic injury during pregnancy, antepartum, third trimester 1/4/2019         ROS:  An 8 point review of symptoms negative except for chief complaint as described above. PHYSICAL EXAM:  Blood pressure 136/86, pulse 89, temperature 98.5 °F (36.9 °C), resp. rate 18, last menstrual period 05/29/2018, SpO2 97 %, currently breastfeeding. Constitutional: The patient appears well, alert, oriented x 3. Cardiovascular: Heart RRR, no murmurs. Respiratory: Lungs clear, no respiratory distress  GI: Abdomen soft, nontender, no guarding  Incision: well healed  Lower ext: no edema, neg ed's, reflexes +2  Psychiatric:Mood/ Affect: appropriate  UA: negative for protein    I personally reviewed pt's medical record including relevant labs and ultrasounds    Assessment/Plan:  Pt's BP is normal in the [facility] at this time; she has no proteinuria and is asymptomatic. Pt is reassured. She is discharged to home with PP PEC precautions. Pt to f/u with her PObP on 3/11/19 for BP check.

## 2019-03-11 ENCOUNTER — HOSPITAL ENCOUNTER (EMERGENCY)
Age: 29
Discharge: HOME | End: 2019-03-11
Attending: EMERGENCY MEDICINE
Payer: COMMERCIAL

## 2019-03-11 ENCOUNTER — APPOINTMENT (OUTPATIENT)
Dept: GENERAL RADIOLOGY | Age: 29
End: 2019-03-11
Attending: EMERGENCY MEDICINE
Payer: COMMERCIAL

## 2019-03-11 VITALS
WEIGHT: 129 LBS | BODY MASS INDEX: 23.74 KG/M2 | TEMPERATURE: 98.4 F | SYSTOLIC BLOOD PRESSURE: 132 MMHG | RESPIRATION RATE: 18 BRPM | HEART RATE: 84 BPM | HEIGHT: 62 IN | OXYGEN SATURATION: 100 % | DIASTOLIC BLOOD PRESSURE: 94 MMHG

## 2019-03-11 DIAGNOSIS — R03.0 ELEVATED BLOOD PRESSURE READING: ICD-10-CM

## 2019-03-11 DIAGNOSIS — R07.9 ACUTE CHEST PAIN: Primary | ICD-10-CM

## 2019-03-11 DIAGNOSIS — R60.9 DEPENDENT EDEMA: ICD-10-CM

## 2019-03-11 LAB
ALBUMIN SERPL-MCNC: 3.4 G/DL (ref 3.5–5)
ALBUMIN/GLOB SERPL: 1 {RATIO}
ALP SERPL-CCNC: 135 U/L (ref 50–130)
ALT SERPL-CCNC: 51 U/L (ref 12–65)
ANION GAP SERPL CALC-SCNC: 7 MMOL/L
AST SERPL-CCNC: 31 U/L (ref 15–37)
ATRIAL RATE: 72 BPM
BACTERIA URNS QL MICRO: 0 /HPF
BASOPHILS # BLD: 0.1 K/UL (ref 0–0.2)
BASOPHILS NFR BLD: 1 % (ref 0–2)
BILIRUB SERPL-MCNC: 0.8 MG/DL (ref 0.2–1.1)
BNP SERPL-MCNC: 40 PG/ML
BUN SERPL-MCNC: 10 MG/DL (ref 6–23)
CALCIUM SERPL-MCNC: 8.5 MG/DL (ref 8.3–10.4)
CALCULATED P AXIS, ECG09: 4 DEGREES
CALCULATED R AXIS, ECG10: 78 DEGREES
CALCULATED T AXIS, ECG11: 66 DEGREES
CASTS URNS QL MICRO: NORMAL /LPF
CHLORIDE SERPL-SCNC: 109 MMOL/L (ref 98–107)
CO2 SERPL-SCNC: 27 MMOL/L (ref 21–32)
CREAT SERPL-MCNC: 0.89 MG/DL (ref 0.6–1)
D DIMER PPP FEU-MCNC: 0.8 UG/ML(FEU)
DIAGNOSIS, 93000: NORMAL
DIFFERENTIAL METHOD BLD: NORMAL
EOSINOPHIL # BLD: 0.1 K/UL (ref 0–0.8)
EOSINOPHIL NFR BLD: 2 % (ref 0.5–7.8)
EPI CELLS #/AREA URNS HPF: NORMAL /HPF
ERYTHROCYTE [DISTWIDTH] IN BLOOD BY AUTOMATED COUNT: 13.6 % (ref 11.9–14.6)
GLOBULIN SER CALC-MCNC: 3.3 G/DL (ref 2.3–3.5)
GLUCOSE SERPL-MCNC: 79 MG/DL (ref 65–100)
HCG UR QL: NEGATIVE
HCT VFR BLD AUTO: 40.5 % (ref 35.8–46.3)
HGB BLD-MCNC: 13.3 G/DL (ref 11.7–15.4)
IMM GRANULOCYTES # BLD AUTO: 0 K/UL (ref 0–0.5)
IMM GRANULOCYTES NFR BLD AUTO: 0 % (ref 0–5)
LYMPHOCYTES # BLD: 2 K/UL (ref 0.5–4.6)
LYMPHOCYTES NFR BLD: 27 % (ref 13–44)
MCH RBC QN AUTO: 30.1 PG (ref 26.1–32.9)
MCHC RBC AUTO-ENTMCNC: 32.8 G/DL (ref 31.4–35)
MCV RBC AUTO: 91.6 FL (ref 79.6–97.8)
MONOCYTES # BLD: 0.5 K/UL (ref 0.1–1.3)
MONOCYTES NFR BLD: 7 % (ref 4–12)
NEUTS SEG # BLD: 4.7 K/UL (ref 1.7–8.2)
NEUTS SEG NFR BLD: 63 % (ref 43–78)
NRBC # BLD: 0 K/UL (ref 0–0.2)
P-R INTERVAL, ECG05: 116 MS
PLATELET # BLD AUTO: 220 K/UL (ref 150–450)
PMV BLD AUTO: 11.6 FL (ref 9.4–12.3)
POTASSIUM SERPL-SCNC: 3.3 MMOL/L (ref 3.5–5.1)
PROT SERPL-MCNC: 6.7 G/DL
Q-T INTERVAL, ECG07: 424 MS
QRS DURATION, ECG06: 72 MS
QTC CALCULATION (BEZET), ECG08: 464 MS
RBC # BLD AUTO: 4.42 M/UL (ref 4.05–5.2)
RBC #/AREA URNS HPF: NORMAL /HPF
SODIUM SERPL-SCNC: 143 MMOL/L (ref 136–145)
VENTRICULAR RATE, ECG03: 72 BPM
WBC # BLD AUTO: 7.4 K/UL (ref 4.3–11.1)
WBC URNS QL MICRO: NORMAL /HPF

## 2019-03-11 PROCEDURE — 80053 COMPREHEN METABOLIC PANEL: CPT

## 2019-03-11 PROCEDURE — 81003 URINALYSIS AUTO W/O SCOPE: CPT | Performed by: EMERGENCY MEDICINE

## 2019-03-11 PROCEDURE — 85379 FIBRIN DEGRADATION QUANT: CPT

## 2019-03-11 PROCEDURE — 99285 EMERGENCY DEPT VISIT HI MDM: CPT | Performed by: EMERGENCY MEDICINE

## 2019-03-11 PROCEDURE — 85025 COMPLETE CBC W/AUTO DIFF WBC: CPT

## 2019-03-11 PROCEDURE — 81025 URINE PREGNANCY TEST: CPT

## 2019-03-11 PROCEDURE — 83880 ASSAY OF NATRIURETIC PEPTIDE: CPT

## 2019-03-11 PROCEDURE — 81015 MICROSCOPIC EXAM OF URINE: CPT

## 2019-03-11 PROCEDURE — 71046 X-RAY EXAM CHEST 2 VIEWS: CPT

## 2019-03-11 PROCEDURE — 93005 ELECTROCARDIOGRAM TRACING: CPT | Performed by: EMERGENCY MEDICINE

## 2019-03-11 RX ORDER — LABETALOL 100 MG/1
100 TABLET, FILM COATED ORAL 2 TIMES DAILY
Qty: 60 TAB | Refills: 0 | Status: SHIPPED | OUTPATIENT
Start: 2019-03-11

## 2019-03-11 RX ORDER — SODIUM CHLORIDE 0.9 % (FLUSH) 0.9 %
5-40 SYRINGE (ML) INJECTION AS NEEDED
Status: DISCONTINUED | OUTPATIENT
Start: 2019-03-11 | End: 2019-03-11 | Stop reason: HOSPADM

## 2019-03-11 RX ORDER — LABETALOL 100 MG/1
100 TABLET, FILM COATED ORAL 2 TIMES DAILY
Qty: 60 TAB | Refills: 0 | Status: SHIPPED | OUTPATIENT
Start: 2019-03-11 | End: 2019-03-11

## 2019-03-11 RX ORDER — SODIUM CHLORIDE 0.9 % (FLUSH) 0.9 %
5-40 SYRINGE (ML) INJECTION EVERY 8 HOURS
Status: DISCONTINUED | OUTPATIENT
Start: 2019-03-11 | End: 2019-03-11 | Stop reason: HOSPADM

## 2019-03-11 NOTE — ED PROVIDER NOTES
51-year-old female 3 weeks status post  for failure to progress, presents to emergency room slowly elevating blood pressures over the last couple of weeks and increased shortness of breath the last few days with some swelling to the left lower extremity, particularly around the ankle. She denies any fever or chills. The history is provided by the patient. Shortness of Breath   This is a new problem. The average episode lasts 3 days. The problem occurs continuously. The current episode started more than 2 days ago. The problem has been gradually worsening. Associated symptoms include headaches (dull, frontal), chest pain (right sided for the last several days, primarily under the right breast and around to the back.) and leg swelling. Pertinent negatives include no fever, no coryza, no rhinorrhea, no sore throat, no swollen glands, no ear pain, no neck pain, no cough, no sputum production, no hemoptysis, no wheezing, no PND, no orthopnea, no syncope, no vomiting, no abdominal pain, no rash, no leg pain and no claudication. Precipitated by: recent surgery and childbirth. She has tried nothing for the symptoms. The treatment provided no relief. She has had no prior hospitalizations. She has had no prior ED visits. She has had no prior ICU admissions. Associated medical issues include recent surgery. Associated medical issues do not include asthma, COPD, pneumonia, chronic lung disease, PE, CAD, heart failure, past MI or DVT.         Past Medical History:   Diagnosis Date    Abnormal Papanicolaou smear of cervix     Anemia     Dysmenorrhea     better on OCP    Hx of abnormal Pap smear 13 neg pap, +HPV neg 16 and18;2012 ASCUS pap,+HPV; 2011 neg pap, +HPV neg 16 and 18    Hypertension affecting pregnancy, third trimester 2019    Psychiatric problem     Traumatic injury during pregnancy, antepartum, third trimester 2019       Past Surgical History:   Procedure Laterality Date  HX COLPOSCOPY  4/15/13    4/13 neg;3/12 [facility] I endo and ecto;3/11 [facility] I endo and ecto         Family History:   Problem Relation Age of Onset    Diabetes Paternal Aunt     Hypertension Maternal Grandmother     Diabetes Maternal Grandmother     No Known Problems Mother     No Known Problems Father     Diabetes Maternal Grandfather     No Known Problems Paternal Grandmother     No Known Problems Paternal Grandfather        Social History     Socioeconomic History    Marital status:      Spouse name: Angelica Wilkins    Number of children: 0    Years of education: masters    Highest education level: Master's degree (e.g., MA, MS, Amy, MEd, MSW, [employer])   Social Needs    Financial resource strain: Not very hard    Food insecurity - worry: Never true    Food insecurity - inability: Never true    Transportation needs - medical: No    Transportation needs - non-medical: No   Occupational History    Not on file   Tobacco Use    Smoking status: Never Smoker    Smokeless tobacco: Never Used   Substance and Sexual Activity    Alcohol use: Yes     Comment: none with +UPT    Drug use: No    Sexual activity: Yes     Partners: Male     Birth control/protection: None   Other Topics Concern     Service Not Asked    Blood Transfusions Not Asked    Caffeine Concern Not Asked    Occupational Exposure Not Asked    Hobby Hazards Not Asked    Sleep Concern Not Asked    Stress Concern Not Asked    Weight Concern Not Asked    Special Diet Not Asked    Back Care Not Asked    Exercise Not Asked    Bike Helmet Not Asked    Seat Belt Not Asked    Self-Exams Not Asked   Social History Narrative    Not on file         ALLERGIES: Patient has no known allergies. Review of Systems   Constitutional: Negative for chills and fever. HENT: Negative for ear pain, rhinorrhea and sore throat. Respiratory: Positive for shortness of breath.  Negative for cough, hemoptysis, sputum production and wheezing. Cardiovascular: Positive for chest pain (right sided for the last several days, primarily under the right breast and around to the back.) and leg swelling. Negative for orthopnea, claudication, syncope and PND. Gastrointestinal: Negative for abdominal pain and vomiting. Musculoskeletal: Negative for neck pain. Skin: Negative for rash. Neurological: Positive for headaches (dull, frontal). All other systems reviewed and are negative. Vitals:    03/11/19 1616   BP: (!) 152/97   Pulse: 91   Resp: 20   Temp: 98.5 °F (36.9 °C)   SpO2: 99%   Weight: 58.5 kg (129 lb)   Height: 5' 2\" (1.575 m)            Physical Exam   Constitutional: She is oriented to person, place, and time. She appears well-developed and well-nourished. No distress. HENT:   Head: Normocephalic and atraumatic. Right Ear: External ear normal.   Left Ear: External ear normal.   Mouth/Throat: Oropharynx is clear and moist.   Eyes: Conjunctivae and EOM are normal. Pupils are equal, round, and reactive to light. Neck: Normal range of motion. Neck supple. No thyromegaly present. Cardiovascular: Normal rate, regular rhythm, normal heart sounds and intact distal pulses. Pulmonary/Chest: Effort normal and breath sounds normal. No respiratory distress. She has no wheezes. She has no rales. Abdominal: Soft. Bowel sounds are normal. There is no tenderness. No hernia. Musculoskeletal: Normal range of motion. She exhibits edema (1+ left lower extremity with no evidence of calf tenderness, negative Homans). Neurological: She is alert and oriented to person, place, and time. Skin: Skin is warm and dry. Capillary refill takes less than 2 seconds. Psychiatric: She has a normal mood and affect. Nursing note and vitals reviewed.        MDM  Number of Diagnoses or Management Options  Acute chest pain: new and requires workup  Dependent edema: new and requires workup  Elevated blood pressure reading: new and requires workup Amount and/or Complexity of Data Reviewed  Clinical lab tests: ordered and reviewed  Tests in the radiology section of CPT®: ordered and reviewed  Review and summarize past medical records: yes (Patient had negative CT of the chest 2 weeks ago for similar symptoms.)    Risk of Complications, Morbidity, and/or Mortality  Presenting problems: high  Diagnostic procedures: moderate  Management options: moderate    Patient Progress  Patient progress: improved         Procedures      The patient was observed in the ED. Results Reviewed:      Recent Results (from the past 24 hour(s))   CBC WITH AUTOMATED DIFF    Collection Time: 03/11/19  6:34 PM   Result Value Ref Range    WBC 7.4 4.3 - 11.1 K/uL    RBC 4.42 4.05 - 5.2 M/uL    HGB 13.3 11.7 - 15.4 g/dL    HCT 40.5 35.8 - 46.3 %    MCV 91.6 79.6 - 97.8 FL    MCH 30.1 26.1 - 32.9 PG    MCHC 32.8 31.4 - 35.0 g/dL    RDW 13.6 11.9 - 14.6 %    PLATELET 973 930 - 523 K/uL    MPV 11.6 9.4 - 12.3 FL    ABSOLUTE NRBC 0.00 0.0 - 0.2 K/uL    DF AUTOMATED      NEUTROPHILS 63 43 - 78 %    LYMPHOCYTES 27 13 - 44 %    MONOCYTES 7 4.0 - 12.0 %    EOSINOPHILS 2 0.5 - 7.8 %    BASOPHILS 1 0.0 - 2.0 %    IMMATURE GRANULOCYTES 0 0.0 - 5.0 %    ABS. NEUTROPHILS 4.7 1.7 - 8.2 K/UL    ABS. LYMPHOCYTES 2.0 0.5 - 4.6 K/UL    ABS. MONOCYTES 0.5 0.1 - 1.3 K/UL    ABS. EOSINOPHILS 0.1 0.0 - 0.8 K/UL    ABS. BASOPHILS 0.1 0.0 - 0.2 K/UL    ABS. IMM.  GRANS. 0.0 0.0 - 0.5 K/UL   METABOLIC PANEL, COMPREHENSIVE    Collection Time: 03/11/19  6:34 PM   Result Value Ref Range    Sodium 143 136 - 145 mmol/L    Potassium 3.3 (L) 3.5 - 5.1 mmol/L    Chloride 109 (H) 98 - 107 mmol/L    CO2 27 21 - 32 mmol/L    Anion gap 7 mmol/L    Glucose 79 65 - 100 mg/dL    BUN 10 6 - 23 MG/DL    Creatinine 0.89 0.6 - 1.0 MG/DL    GFR est AA >60 >60 ml/min/1.73m2    GFR est non-AA >60 ml/min/1.73m2    Calcium 8.5 8.3 - 10.4 MG/DL    Bilirubin, total 0.8 0.2 - 1.1 MG/DL    ALT (SGPT) 51 12 - 65 U/L    AST (SGOT) 31 15 - 37 U/L    Alk. phosphatase 135 (H) 50 - 130 U/L    Protein, total 6.7 g/dL    Albumin 3.4 (L) 3.5 - 5.0 g/dL    Globulin 3.3 2.3 - 3.5 g/dL    A-G Ratio 1.0     BNP    Collection Time: 03/11/19  6:34 PM   Result Value Ref Range    BNP 40 pg/mL   D DIMER    Collection Time: 03/11/19  6:34 PM   Result Value Ref Range    D DIMER 0.80 () <0.56 ug/ml(FEU)   EKG, 12 LEAD, INITIAL    Collection Time: 03/11/19  6:37 PM   Result Value Ref Range    Ventricular Rate 72 BPM    Atrial Rate 72 BPM    P-R Interval 116 ms    QRS Duration 72 ms    Q-T Interval 424 ms    QTC Calculation (Bezet) 464 ms    Calculated P Axis 4 degrees    Calculated R Axis 78 degrees    Calculated T Axis 66 degrees    Diagnosis       Normal sinus rhythm  Nonspecific T wave abnormality  Prolonged QT  Abnormal ECG  When compared with ECG of 28-FEB-2019 14:48,  Nonspecific T wave abnormality now evident in Inferior leads  Nonspecific T wave abnormality now evident in Lateral leads  Confirmed by ST ANNA HILL MD (), MARKO CARRILLO ([id number]) on 3/11/2019 8:22:51 PM     HCG URINE, QL. - POC    Collection Time: 03/11/19  7:47 PM   Result Value Ref Range    Pregnancy test,urine (POC) NEGATIVE  NEG     URINE MICROSCOPIC    Collection Time: 03/11/19  7:59 PM   Result Value Ref Range    WBC 3-5 0 /hpf    RBC 0-3 0 /hpf    Epithelial cells 0-3 0 /hpf    Bacteria 0 0 /hpf    Casts 0-3 0 /lpf     XR CHEST PA LAT   Final Result   IMPRESSION: No consolidation. I discussed the results of all labs, procedures, radiographs, and treatments with the patient and available family. Treatment plan is agreed upon and the patient is ready for discharge. All voiced understanding of the discharge plan and medication instructions or changes as appropriate. Questions about treatment in the ED were answered. All were encouraged to return should symptoms worsen or new problems develop.

## 2019-03-11 NOTE — ED TRIAGE NOTES
Pt states SOB with pain in right ribs for a few days. States she had a C section about three weeks ago.

## 2019-03-12 NOTE — DISCHARGE INSTRUCTIONS
Patient Education      Return for worsening shortness of breath or worsening lower extremity edema. Elevated Blood Pressure: Care Instructions  Your Care Instructions    Blood pressure is a measure of how hard the blood pushes against the walls of your arteries. It's normal for blood pressure to go up and down throughout the day. But if it stays up over time, you have high blood pressure. Two numbers tell you your blood pressure. The first number is the systolic pressure. It shows how hard the blood pushes when your heart is pumping. The second number is the diastolic pressure. It shows how hard the blood pushes between heartbeats, when your heart is relaxed and filling with blood. An ideal blood pressure in adults is less than 120/80 (say \"120 over 80\"). High blood pressure is 140/90 or higher. You have high blood pressure if your top number is 140 or higher or your bottom number is 90 or higher, or both. The main test for high blood pressure is simple, fast, and painless. To diagnose high blood pressure, your doctor will test your blood pressure at different times. After testing your blood pressure, your doctor may ask you to test it again when you are home. If you are diagnosed with high blood pressure, you can work with your doctor to make a long-term plan to manage it. Follow-up care is a key part of your treatment and safety. Be sure to make and go to all appointments, and call your doctor if you are having problems. It's also a good idea to know your test results and keep a list of the medicines you take. How can you care for yourself at home? · Do not smoke. Smoking increases your risk for heart attack and stroke. If you need help quitting, talk to your doctor about stop-smoking programs and medicines. These can increase your chances of quitting for good. · Stay at a healthy weight. · Try to limit how much sodium you eat to less than 2,300 milligrams (mg) a day.  Your doctor may ask you to try to eat less than 1,500 mg a day. · Be physically active. Get at least 30 minutes of exercise on most days of the week. Walking is a good choice. You also may want to do other activities, such as running, swimming, cycling, or playing tennis or team sports. · Avoid or limit alcohol. Talk to your doctor about whether you can drink any alcohol. · Eat plenty of fruits, vegetables, and low-fat dairy products. Eat less saturated and total fats. · Learn how to check your blood pressure at home. When should you call for help? Call your doctor now or seek immediate medical care if:  ? · Your blood pressure is much higher than normal (such as 180/110 or higher). ? · You think high blood pressure is causing symptoms such as:  ¨ Severe headache. ¨ Blurry vision. ? Watch closely for changes in your health, and be sure to contact your doctor if:  ? · You do not get better as expected. Where can you learn more? Go to http://shaIntelliWare Systemsjerardo.info/. Enter H349 in the search box to learn more about \"Elevated Blood Pressure: Care Instructions. \"  Current as of: September 21, 2016  Content Version: 11.4  © 2596-2323 [employer]. Care instructions adapted under license by [employer] (which disclaims liability or warranty for this information). If you have questions about a medical condition or this instruction, always ask your healthcare professional. [place] [address] any warranty or liability for your use of this information. Patient Education        Leg and Ankle Edema: Care Instructions  Your Care Instructions  Swelling in the legs, ankles, and feet is called edema. It is common after you sit or stand for a while. Long plane flights or car rides often cause swelling in the legs and feet. You may also have swelling if you have to stand for long periods of time at your job.  Problems with the veins in the legs (varicose veins) and changes in hormones can also cause swelling. Sometimes the swelling in the ankles and feet is caused by a more serious problem, such as heart failure, infection, blood clots, or liver or kidney disease. Follow-up care is a key part of your treatment and safety. Be sure to make and go to all appointments, and call your doctor if you are having problems. It's also a good idea to know your test results and keep a list of the medicines you take. How can you care for yourself at home? · If your doctor gave you medicine, take it as prescribed. Call your doctor if you think you are having a problem with your medicine. · Whenever you are resting, raise your legs up. Try to keep the swollen area higher than the level of your heart. · Take breaks from standing or sitting in one position. ? Walk around to increase the blood flow in your lower legs. ? Move your feet and ankles often while you stand, or tighten and relax your leg muscles. · Wear support stockings. Put them on in the morning, before swelling gets worse. · Eat a balanced diet. Lose weight if you need to. · Limit the amount of salt (sodium) in your diet. Salt holds fluid in the body and may increase swelling. When should you call for help? Call 911 anytime you think you may need emergency care. For example, call if:    · You have symptoms of a blood clot in your lung (called a pulmonary embolism). These may include:  ? Sudden chest pain. ? Trouble breathing. ? Coughing up blood.    Call your doctor now or seek immediate medical care if:    · You have signs of a blood clot, such as:  ? Pain in your calf, back of the knee, thigh, or groin. ? Redness and swelling in your leg or groin.     · You have symptoms of infection, such as:  ? Increased pain, swelling, warmth, or redness. ? Red streaks or pus. ? A fever.    Watch closely for changes in your health, and be sure to contact your doctor if:    · Your swelling is getting worse.     · You have new or worsening pain in your legs.   · You do not get better as expected. Where can you learn more? Go to http://sha-jerardo.info/. Enter X490 in the search box to learn more about \"Leg and Ankle Edema: Care Instructions. \"  Current as of: September 23, 2018  Content Version: 11.9  © 8313-0695 [employer]. Care instructions adapted under license by [employer] (which disclaims liability or warranty for this information). If you have questions about a medical condition or this instruction, always ask your healthcare professional. [place] [address] any warranty or liability for your use of this information. Patient Education        Musculoskeletal Chest Pain: Care Instructions  Your Care Instructions    Chest pain is not always a sign that something is wrong with your heart or that you have another serious problem. The doctor thinks your chest pain is caused by strained muscles or ligaments, inflamed chest cartilage, or another problem in your chest, rather than by your heart. You may need more tests to find the cause of your chest pain. Follow-up care is a key part of your treatment and safety. Be sure to make and go to all appointments, and call your doctor if you are having problems. It's also a good idea to know your test results and keep a list of the medicines you take. How can you care for yourself at home? · Take pain medicines exactly as directed. ? If the doctor gave you a prescription medicine for pain, take it as prescribed. ? If you are not taking a prescription pain medicine, ask your doctor if you can take an over-the-counter medicine. · Rest and protect the sore area. · Stop, change, or take a break from any activity that may be causing your pain or soreness. · Put ice or a cold pack on the sore area for 10 to 20 minutes at a time. Try to do this every 1 to 2 hours for the next 3 days (when you are awake) or until the swelling goes down.  Put a thin cloth between the ice and your skin. · After 2 or 3 days, apply a heating pad set on low or a warm cloth to the area that hurts. Some doctors suggest that you go back and forth between hot and cold. · Do not wrap or tape your ribs for support. This may cause you to take smaller breaths, which could increase your risk of lung problems. · Mentholated creams such as Bengay or Icy Hot may soothe sore muscles. Follow the instructions on the package. · Follow your doctor's instructions for exercising. · Gentle stretching and massage may help you get better faster. Stretch slowly to the point just before pain begins, and hold the stretch for at least 15 to 30 seconds. Do this 3 or 4 times a day. Stretch just after you have applied heat. · As your pain gets better, slowly return to your normal activities. Any increased pain may be a sign that you need to rest a while longer. When should you call for help? Call 911 anytime you think you may need emergency care. For example, call if:    · You have chest pain or pressure. This may occur with:  ? Sweating. ? Shortness of breath. ? Nausea or vomiting. ? Pain that spreads from the chest to the neck, jaw, or one or both shoulders or arms. ? Dizziness or lightheadedness. ? A fast or uneven pulse. After calling 911, chew 1 adult-strength aspirin. Wait for an ambulance. Do not try to drive yourself.     · You have sudden chest pain and shortness of breath, or you cough up blood.    Call your doctor now or seek immediate medical care if:    · You have any trouble breathing.     · Your chest pain gets worse.     · Your chest pain occurs consistently with exercise and is relieved by rest.    Watch closely for changes in your health, and be sure to contact your doctor if:    · Your chest pain does not get better after 1 week. Where can you learn more? Go to http://sha-jerardo.info/.   Enter 1817 [id number] in the search box to learn more about \"Musculoskeletal Chest Pain: Care Instructions. \"  Current as of: September 23, 2018  Content Version: 11.9  © 3893-3187 [employer]. Care instructions adapted under license by [employer] (which disclaims liability or warranty for this information). If you have questions about a medical condition or this instruction, always ask your healthcare professional. [place] [address] any warranty or liability for your use of this information.

## 2019-03-12 NOTE — ED NOTES
I have reviewed discharge instructions with the patient. The patient verbalized understanding. Patient left ED via Discharge Method: ambulatory to Home with . Opportunity for questions and clarification provided. Patient given 1 scripts. To continue your aftercare when you leave the hospital, you may receive an automated call from our care team to check in on how you are doing. This is a free service and part of our promise to provide the best care and service to meet your aftercare needs.  If you have questions, or wish to unsubscribe from this service please call [phone]. Thank you for Choosing our [facility].

## (undated) DEVICE — SOLUTION IRRIG 1000ML H2O STRL BLT

## (undated) DEVICE — SUTURE MCRYL SZ 4-0 L27IN ABSRB UD L19MM PS-2 1/2 CIR PRIM Y426H

## (undated) DEVICE — KENDALL SCD EXPRESS SLEEVES, KNEE LENGTH, MEDIUM: Brand: KENDALL SCD

## (undated) DEVICE — SUTURE MCRYL SZ 3-0 L36IN ABSRB UD L36MM CT-1 1/2 CIR Y944H

## (undated) DEVICE — SOLUTION IV 1000ML 0.9% SOD CHL

## (undated) DEVICE — AMD ANTIMICROBIAL GAUZE SPONGES,12 PLY USP TYPE VII, 0.2% POLYHEXAMETHYLENE BIGUANIDE HCI (PHMB): Brand: CURITY

## (undated) DEVICE — REM POLYHESIVE ADULT PATIENT RETURN ELECTRODE: Brand: VALLEYLAB

## (undated) DEVICE — CATH FOL TY IC BAG 16FR 2000ML -- CONVERT TO ITEM 363158

## (undated) DEVICE — SUTURE PLN GUT SZ 2-0 L27IN ABSRB YELLOWISH TAN L40MM CT 853H

## (undated) DEVICE — SUTURE PDS II SZ 0 L27IN ABSRB VLT L36MM CT-1 1/2 CIR Z340H

## (undated) DEVICE — SURGICAL PROCEDURE PACK C SECT CDS

## (undated) DEVICE — STERILE POLYISOPRENE POWDER-FREE SURGICAL GLOVES: Brand: PROTEXIS

## (undated) DEVICE — PREMIUM WET SKIN PREP TRAY: Brand: MEDLINE INDUSTRIES, INC.

## (undated) DEVICE — SUTURE MCRYL SZ 1 L36IN ABSRB UD L36MM CT-1 1/2 CIR Y947H

## (undated) DEVICE — MEDI-VAC NON-CONDUCTIVE SUCTION TUBING: Brand: CARDINAL HEALTH

## (undated) DEVICE — AMD ANTIMICROBIAL NON-ADHERENT PAD,0.2% POLYHEXAMETHYLENE BIGUANIDE HCI (PHMB): Brand: TELFA

## (undated) DEVICE — PENCIL ES L3M BTTN SWCH S STL HEX LOK BLDE ELECTRD HOLSTER

## (undated) DEVICE — SUTURE VCRL SZ 0 L36IN ABSRB UD L48MM CTX 1/2 CIR J978H

## (undated) DEVICE — Device: Brand: PORTEX